# Patient Record
Sex: FEMALE | Race: BLACK OR AFRICAN AMERICAN | Employment: OTHER | URBAN - METROPOLITAN AREA
[De-identification: names, ages, dates, MRNs, and addresses within clinical notes are randomized per-mention and may not be internally consistent; named-entity substitution may affect disease eponyms.]

---

## 2017-04-17 ENCOUNTER — HOSPITAL ENCOUNTER (OUTPATIENT)
Dept: LAB | Age: 47
Discharge: HOME | End: 2017-04-17
Payer: COMMERCIAL

## 2017-04-17 ENCOUNTER — OFFICE VISIT (OUTPATIENT)
Dept: FAMILY MEDICINE CLINIC | Age: 47
End: 2017-04-17

## 2017-04-17 VITALS
TEMPERATURE: 98.7 F | HEART RATE: 71 BPM | HEIGHT: 65 IN | OXYGEN SATURATION: 98 % | SYSTOLIC BLOOD PRESSURE: 148 MMHG | BODY MASS INDEX: 44.12 KG/M2 | DIASTOLIC BLOOD PRESSURE: 98 MMHG | WEIGHT: 264.8 LBS | RESPIRATION RATE: 12 BRPM

## 2017-04-17 DIAGNOSIS — E55.9 VITAMIN D DEFICIENCY: ICD-10-CM

## 2017-04-17 DIAGNOSIS — E78.5 HYPERLIPIDEMIA, UNSPECIFIED HYPERLIPIDEMIA TYPE: ICD-10-CM

## 2017-04-17 DIAGNOSIS — I10 ESSENTIAL HYPERTENSION: ICD-10-CM

## 2017-04-17 DIAGNOSIS — B35.3 TINEA PEDIS OF BOTH FEET: ICD-10-CM

## 2017-04-17 DIAGNOSIS — E11.9 CONTROLLED TYPE 2 DIABETES MELLITUS WITHOUT COMPLICATION, WITHOUT LONG-TERM CURRENT USE OF INSULIN (HCC): ICD-10-CM

## 2017-04-17 DIAGNOSIS — E11.9 CONTROLLED TYPE 2 DIABETES MELLITUS WITHOUT COMPLICATION, WITHOUT LONG-TERM CURRENT USE OF INSULIN (HCC): Primary | ICD-10-CM

## 2017-04-17 LAB
ANION GAP BLD CALC-SCNC: 9 MMOL/L (ref 3–18)
BUN SERPL-MCNC: 10 MG/DL (ref 7–18)
BUN/CREAT SERPL: 14 (ref 12–20)
CALCIUM SERPL-MCNC: 9.1 MG/DL (ref 8.5–10.1)
CHLORIDE SERPL-SCNC: 104 MMOL/L (ref 100–108)
CHOLEST SERPL-MCNC: 203 MG/DL
CO2 SERPL-SCNC: 28 MMOL/L (ref 21–32)
CREAT SERPL-MCNC: 0.74 MG/DL (ref 0.6–1.3)
EST. AVERAGE GLUCOSE BLD GHB EST-MCNC: 140 MG/DL
GLUCOSE SERPL-MCNC: 71 MG/DL (ref 74–99)
HBA1C MFR BLD: 6.5 % (ref 4.2–5.6)
HDLC SERPL-MCNC: 46 MG/DL (ref 40–60)
HDLC SERPL: 4.4 {RATIO} (ref 0–5)
LDLC SERPL CALC-MCNC: 141.4 MG/DL (ref 0–100)
LIPID PROFILE,FLP: ABNORMAL
POTASSIUM SERPL-SCNC: 4.8 MMOL/L (ref 3.5–5.5)
SODIUM SERPL-SCNC: 141 MMOL/L (ref 136–145)
TRIGL SERPL-MCNC: 78 MG/DL (ref ?–150)
VLDLC SERPL CALC-MCNC: 15.6 MG/DL

## 2017-04-17 PROCEDURE — 82043 UR ALBUMIN QUANTITATIVE: CPT | Performed by: FAMILY MEDICINE

## 2017-04-17 PROCEDURE — 83036 HEMOGLOBIN GLYCOSYLATED A1C: CPT | Performed by: FAMILY MEDICINE

## 2017-04-17 PROCEDURE — 80061 LIPID PANEL: CPT | Performed by: FAMILY MEDICINE

## 2017-04-17 PROCEDURE — 80048 BASIC METABOLIC PNL TOTAL CA: CPT | Performed by: FAMILY MEDICINE

## 2017-04-17 PROCEDURE — 82306 VITAMIN D 25 HYDROXY: CPT | Performed by: FAMILY MEDICINE

## 2017-04-17 RX ORDER — LEVONORGESTREL 52 MG/1
INTRAUTERINE DEVICE INTRAUTERINE
COMMUNITY
Start: 2017-03-29

## 2017-04-17 RX ORDER — ENALAPRIL MALEATE AND HYDROCHLOROTHIAZIDE 10; 25 MG/1; MG/1
TABLET ORAL
Qty: 60 TAB | Refills: 1 | Status: SHIPPED | OUTPATIENT
Start: 2017-04-17 | End: 2017-10-19 | Stop reason: SDUPTHER

## 2017-04-17 RX ORDER — ERGOCALCIFEROL 1.25 MG/1
50000 CAPSULE ORAL 2 TIMES WEEKLY
Qty: 30 CAP | Refills: 4 | Status: SHIPPED | OUTPATIENT
Start: 2017-04-17 | End: 2017-10-19 | Stop reason: SDUPTHER

## 2017-04-17 NOTE — PROGRESS NOTES
Patient here for f/u on HTN. Will need refills today. 1. Have you been to the ER, urgent care clinic since your last visit? Hospitalized since your last visit? No    2. Have you seen or consulted any other health care providers outside of the [address] [place] [address] since your last visit? Include any pap smears or colon screening. Yes When: march 2017 Where: [facility] Reason for visit: Annual  Seen by Podiatry in Feb does not remember doctors name.

## 2017-04-17 NOTE — PATIENT INSTRUCTIONS
Please schedule your eye exam.      Athlete's Foot: Care Instructions  Your Care Instructions  Athlete's foot is an itchy rash on the foot caused by an infection with a fungus. You can get it by going barefoot in wet public areas, such as swimming pools or locker rooms. Many times there is no clear reason why you get athlete's foot. You can easily treat athlete's foot by putting medicine on your feet for 1 to 6 weeks. In some cases, a doctor may prescribe pills to kill the fungus. Follow-up care is a key part of your treatment and safety. Be sure to make and go to all appointments, and call your doctor if you are having problems. It's also a good idea to know your test results and keep a list of the medicines you take. How can you care for yourself at home? · Your doctor may suggest an over-the counter lotion or spray or may prescribe a medicine. Take your medicines exactly as prescribed. Call your doctor if you think you are having a problem with your medicine. · Keep your feet clean and dry. · When you get dressed, put your socks on before your underwear. This can prevent the fungus from spreading from your feet to your groin. To prevent athlete's foot  · Wear flip-flops or other shower sandals in public locker rooms and showers and by the pool. · Dry between your toes after swimming or bathing. · Wear leather shoes or sandals, which let air get to your feet. · Change your socks as needed so your feet stay as dry as possible. · Use antifungal powder on your feet. When should you call for help? Watch closely for changes in your health, and be sure to contact your doctor if:  · You do not get better as expected. Where can you learn more? Go to http://alyssa-amanda.info/. Enter M498 in the search box to learn more about \"Athlete's Foot: Care Instructions. \"  Current as of: October 13, 2016  Content Version: 11.2  © 3976-2091 [employer].  Care instructions adapted under license by [address] (which disclaims liability or warranty for this information). If you have questions about a medical condition or this instruction, always ask your healthcare professional. [address] any warranty or liability for your use of this information.

## 2017-04-17 NOTE — MR AVS SNAPSHOT
Visit Information Date & Time Provider Department Dept. Phone Encounter #  
 4/17/2017 10:00 AM Amandeep Montgomery MD [facility] [phone] [id number] Follow-up Instructions Return in about 2 weeks (around 5/1/2017) for blood pressure follow up and HBV vaccine, no labs prior. Upcoming Health Maintenance Date Due  
 FOOT EXAM Q1 3/29/2017 MICROALBUMIN Q1 3/29/2017 EYE EXAM RETINAL OR DILATED Q1 4/26/2017 HEMOGLOBIN A1C Q6M 5/4/2017 PAP AKA CERVICAL CYTOLOGY 8/21/2017 LIPID PANEL Q1 11/4/2017 DTaP/Tdap/Td series (2 - Td) 9/3/2025 Allergies as of 4/17/2017  Review Complete On: 4/17/2017 By: Amandeep Montgomery MD  
 No Known Allergies Current Immunizations  Reviewed on 3/29/2016 Name Date Hep B Vaccine (Adult) 11/11/2016, 3/29/2016 Influenza Nasal Vaccine 10/14/2013 Influenza Vaccine (Quad) PF 11/11/2016, 9/3/2015 Influenza Vaccine Split 10/13/2011 Pneumococcal Polysaccharide (PPSV-23) 3/29/2016 Tdap 9/3/2015 Not reviewed this visit You Were Diagnosed With   
  
 Codes Comments Controlled type 2 diabetes mellitus without complication, without long-term current use of insulin ([facility]    -  Primary ICD-10-CM: E11.9 ICD-9-CM: 250.00 Essential hypertension     ICD-10-CM: I10 
ICD-9-CM: 401.9 Tinea pedis of both feet     ICD-10-CM: B35.3 ICD-9-CM: 110.4 Vitamin D deficiency     ICD-10-CM: E55.9 ICD-9-CM: 268.9 Vitals BP Pulse Temp Resp Height(growth percentile) Weight(growth percentile) (!) 148/98 (BP 1 Location: Right arm, BP Patient Position: Sitting) 71 98.7 °F (37.1 °C) (Oral) 12 5' 5\" (1.651 m) 264 lb 12.8 oz (120.1 kg) SpO2 BMI OB Status Smoking Status 98% 44.07 kg/m2 IUD Never Smoker Vitals History BMI and BSA Data Body Mass Index Body Surface Area 44.07 kg/m 2 2.35 m 2 Preferred Pharmacy Pharmacy Name Phone [address] [phone] Your Updated Medication List  
  
   
This list is accurate as of: 4/17/17 10:42 AM.  Always use your most recent med list.  
  
  
  
  
 atorvastatin 20 mg tablet Commonly known as:  LIPITOR Take 1 Tab by mouth daily. enalapril-hydroCHLOROthiazide 10-25 mg tablet Commonly known as:  VASERETIC  
TAKE 2 TABLETS BY MOUTH DAILY  
  
 ergocalciferol 50,000 unit capsule Commonly known as:  VITAMIN D2 Take 1 Cap by mouth two (2) times a week. metFORMIN  mg tablet Commonly known as:  GLUCOPHAGE XR Take 2 Tabs by mouth daily (with dinner). MIRENA 20 mcg/24 hr (5 years) IUD Generic drug:  levonorgestrel RESTASIS 0.05 % ophthalmic emulsion Generic drug:  cycloSPORINE  
  
 VITAMIN B-12 PO Take  by mouth. Prescriptions Sent to Pharmacy Refills  
 enalapril-hydroCHLOROthiazide (VASERETIC) 10-25 mg tablet 1 Sig: TAKE 2 TABLETS BY MOUTH DAILY Class: Normal  
 Pharmacy: [facility] [employer] Ph #: [phone]  
 ergocalciferol (VITAMIN D2) 50,000 unit capsule 4 Sig: Take 1 Cap by mouth two (2) times a week. Class: Normal  
 Pharmacy: [address] Ph #: [phone] Route: Oral  
  
We Performed the Following  DIABETES FOOT EXAM [[facility]] Follow-up Instructions Return in about 2 weeks (around 5/1/2017) for blood pressure follow up and HBV vaccine, no labs prior. To-Do List   
 04/17/2017 Lab:  HEMOGLOBIN A1C WITH EAG   
  
 04/17/2017 Lab:  METABOLIC PANEL, BASIC   
  
 04/17/2017 Lab:  MICROALBUMIN, UR, RAND W/ MICROALBUMIN/CREA RATIO   
  
 04/17/2017 Lab:  VITAMIN D, 25 HYDROXY Patient Instructions Please schedule your eye exam. 
 
 
Athlete's Foot: Care Instructions Your Care Instructions Athlete's foot is an itchy rash on the foot caused by an infection with a fungus. You can get it by going barefoot in wet public areas, such as swimming pools or locker rooms. Many times there is no clear reason why you get athlete's foot. You can easily treat athlete's foot by putting medicine on your feet for 1 to 6 weeks. In some cases, a doctor may prescribe pills to kill the fungus. Follow-up care is a key part of your treatment and safety. Be sure to make and go to all appointments, and call your doctor if you are having problems. It's also a good idea to know your test results and keep a list of the medicines you take. How can you care for yourself at home? · Your doctor may suggest an over-the counter lotion or spray or may prescribe a medicine. Take your medicines exactly as prescribed. Call your doctor if you think you are having a problem with your medicine. · Keep your feet clean and dry. · When you get dressed, put your socks on before your underwear. This can prevent the fungus from spreading from your feet to your groin. To prevent athlete's foot · Wear flip-flops or other shower sandals in public locker rooms and showers and by the pool. · Dry between your toes after swimming or bathing. · Wear leather shoes or sandals, which let air get to your feet. · Change your socks as needed so your feet stay as dry as possible. · Use antifungal powder on your feet. When should you call for help? Watch closely for changes in your health, and be sure to contact your doctor if: 
· You do not get better as expected. Where can you learn more? Go to http://alyssa-amanda.info/. Enter M498 in the search box to learn more about \"Athlete's Foot: Care Instructions. \" Current as of: October 13, 2016 Content Version: 11.2 © 5892-1932 [employer].  Care instructions adapted under license by [employer] (which disclaims liability or warranty for this information). If you have questions about a medical condition or this instruction, always ask your healthcare professional. [address] any warranty or liability for your use of this information. Introducing \[facility]\"" & [facility] SERVICES! Dear April First: 
Thank you for requesting a [employer] account. Our records indicate that you have previously registered for a [employer] account but its currently inactive. Please call our [employer] support line at [phone]. Additional Information If you have questions, please visit the Frequently Asked Questions section of the [employer] website at [email or web address] Remember, [employer] is NOT to be used for urgent needs. For medical emergencies, dial 911. Now available from your iPhone and Android! Please provide this summary of care documentation to your next provider. Your primary care clinician is listed as Ever Stevenson. If you have any questions after today's visit, please call [phone].

## 2017-04-18 LAB
25(OH)D3 SERPL-MCNC: 26 NG/ML (ref 30–100)
CREAT UR-MCNC: 366.25 MG/DL (ref 30–125)
MICROALBUMIN UR-MCNC: 1.5 MG/DL (ref 0–3)
MICROALBUMIN/CREAT UR-RTO: 4 MG/G (ref 0–30)

## 2017-05-01 ENCOUNTER — HOSPITAL ENCOUNTER (OUTPATIENT)
Dept: LAB | Age: 47
Discharge: HOME | End: 2017-05-01
Payer: COMMERCIAL

## 2017-05-01 ENCOUNTER — OFFICE VISIT (OUTPATIENT)
Dept: FAMILY MEDICINE CLINIC | Age: 47
End: 2017-05-01

## 2017-05-01 VITALS
OXYGEN SATURATION: 97 % | DIASTOLIC BLOOD PRESSURE: 78 MMHG | SYSTOLIC BLOOD PRESSURE: 140 MMHG | HEART RATE: 83 BPM | HEIGHT: 65 IN | BODY MASS INDEX: 43.82 KG/M2 | TEMPERATURE: 98.2 F | WEIGHT: 263 LBS | RESPIRATION RATE: 12 BRPM

## 2017-05-01 DIAGNOSIS — E66.01 MORBID OBESITY, UNSPECIFIED OBESITY TYPE (HCC): ICD-10-CM

## 2017-05-01 DIAGNOSIS — E11.22 CONTROLLED TYPE 2 DIABETES MELLITUS WITH CHRONIC KIDNEY DISEASE, WITHOUT LONG-TERM CURRENT USE OF INSULIN, UNSPECIFIED CKD STAGE (HCC): ICD-10-CM

## 2017-05-01 DIAGNOSIS — E78.5 HYPERLIPIDEMIA, UNSPECIFIED HYPERLIPIDEMIA TYPE: ICD-10-CM

## 2017-05-01 DIAGNOSIS — I10 ESSENTIAL HYPERTENSION: Primary | ICD-10-CM

## 2017-05-01 LAB
ANION GAP BLD CALC-SCNC: 8 MMOL/L (ref 3–18)
BUN SERPL-MCNC: 11 MG/DL (ref 7–18)
BUN/CREAT SERPL: 13 (ref 12–20)
CALCIUM SERPL-MCNC: 9 MG/DL (ref 8.5–10.1)
CHLORIDE SERPL-SCNC: 103 MMOL/L (ref 100–108)
CO2 SERPL-SCNC: 31 MMOL/L (ref 21–32)
CREAT SERPL-MCNC: 0.83 MG/DL (ref 0.6–1.3)
GLUCOSE SERPL-MCNC: 123 MG/DL (ref 74–99)
POTASSIUM SERPL-SCNC: 3.7 MMOL/L (ref 3.5–5.5)
SODIUM SERPL-SCNC: 142 MMOL/L (ref 136–145)

## 2017-05-01 PROCEDURE — 80048 BASIC METABOLIC PNL TOTAL CA: CPT | Performed by: FAMILY MEDICINE

## 2017-05-01 NOTE — PATIENT INSTRUCTIONS
In Defense of Food: An Eater's Manifesto by [employer]. Mostly plants. Not too much. \"  The rest of this book, written by a  (i.e. no \"doctor-speak\"), explains what exactly each of those three statements mean. It then gives practical advice on how to do so and promote good health. I also recommend The Food Rules by the same author, for a quicker guide to those practical, actionable guidelines. 100daysofOravel  A blog generated by a family that followed his advice and experienced tangible benefits. Practical tips and resources for moving away from processed foods. How to Read a Food Label to Limit Sodium: Care Instructions  Your Care Instructions  Sodium causes your body to hold on to extra water. This can raise your blood pressure and force your heart and kidneys to work harder. In very serious cases, this could cause you to be put in the hospital. It might even be life-threatening. By limiting sodium, you will feel better and lower your risk of serious problems. Processed foods, fast food, and restaurant foods are the major sources of dietary sodium. The most common name for sodium is salt. Try to limit how much sodium you eat to less than 2,300 milligrams (mg) a day. If you limit your sodium to 1,500 mg a day, you can lower your blood pressure even more. This limit counts all the salt that you eat in foods you cook or in packaged foods. Keep a list of everything you eat and drink. Follow-up care is a key part of your treatment and safety. Be sure to make and go to all appointments, and call your doctor if you are having problems. It's also a good idea to know your test results and keep a list of the medicines you take. How can you care for yourself at home? Read ingredient lists on food labels  · Read the list of ingredients on food labels to help you find how much sodium is in a food.  The label lists the ingredients in a food in descending order (from the most to the least). If salt or sodium is high on the list, there may be a lot of sodium in the food. · Know that sodium has different names. Sodium is also called monosodium glutamate (MSG, common in [facility] food), sodium citrate, sodium alginate, sodium hydroxide, and sodium phosphate. Read Nutrition Facts labels  · On most foods, there is a Nutrition Facts label. This will tell you how much sodium is in one serving of food. Look at both the serving size and the sodium amount. The serving size is located at the top of the label, usually right under the \"Nutrition Facts\" title. The amount of sodium is given in the list under the title. It is given in milligrams (mg). ¨ Check the serving size carefully. A single serving is often very small, and you may eat more than one serving. If this is the case, you will eat more sodium than listed on the label. For example, if the serving size for a canned soup is 1 cup and the sodium amount is 470 mg, if you have 2 cups you will eat 940 mg of sodium. · The nutrition facts for fresh fruits and vegetables are not listed on the food. They may be listed somewhere in the store. These foods usually have no sodium or low sodium. · The Nutrition Facts label also gives you the Percent Daily Value for sodium. This is how much of the recommended amount of sodium a serving contains. The daily value for sodium is less than 2,300 mg. So if the Percent Daily Value says 50%, this means one serving is giving you half of this, or 1,150 mg. Buy low-sodium foods  · Look for foods that are made with less sodium. Watch for the following words on the label. ¨ \"Unsalted\" means there is no sodium added to the food. But there may be sodium already in the food naturally. ¨ \"Sodium-free\" means a serving has less than 5 mg of sodium. ¨ \"Very low sodium\" means a serving has 35 mg or less of sodium. ¨ \"Low-sodium\" means a serving has 140 mg or less of sodium.   · \"Reduced-sodium\" means that there is 25% less sodium than what the food normally has. This is still usually too much sodium. Try not to buy foods with this on the label. · Buy fresh vegetables, or frozen vegetables without added sauces. Buy low-sodium versions of canned vegetables, soups, and other canned goods. Where can you learn more? Go to http://alyssa-amanda.info/. Enter 26 [id number] in the search box to learn more about \"How to Read a Food Label to Limit Sodium: Care Instructions. \"  Current as of: July 26, 2016  Content Version: 11.2  © 2774-9342 [employer]. Care instructions adapted under license by [employer] (which disclaims liability or warranty for this information). If you have questions about a medical condition or this instruction, always ask your healthcare professional. [address] any warranty or liability for your use of this information. Low Sodium Diet (2,000 Milligram): Care Instructions  Your Care Instructions  Too much sodium causes your body to hold on to extra water. This can raise your blood pressure and force your heart and kidneys to work harder. In very serious cases, this could cause you to be put in the hospital. It might even be life-threatening. By limiting sodium, you will feel better and lower your risk of serious problems. The most common source of sodium is salt. People get most of the salt in their diet from canned, prepared, and packaged foods. Fast food and restaurant meals also are very high in sodium. Your doctor will probably limit your sodium to less than 2,000 milligrams (mg) a day. This limit counts all the sodium in prepared and packaged foods and any salt you add to your food. Follow-up care is a key part of your treatment and safety. Be sure to make and go to all appointments, and call your doctor if you are having problems. It's also a good idea to know your test results and keep a list of the medicines you take.   How can you care for yourself at home? Read food labels  · Read labels on cans and food packages. The labels tell you how much sodium is in each serving. Make sure that you look at the serving size. If you eat more than the serving size, you have eaten more sodium. · Food labels also tell you the Percent Daily Value for sodium. Choose products with low Percent Daily Values for sodium. · Be aware that sodium can come in forms other than salt, including monosodium glutamate (MSG), sodium citrate, and sodium bicarbonate (baking soda). MSG is often added to Asian food. When you eat out, you can sometimes ask for food without MSG or added salt. Buy low-sodium foods  · Buy foods that are labeled \"unsalted\" (no salt added), \"sodium-free\" (less than 5 mg of sodium per serving), or \"low-sodium\" (less than 140 mg of sodium per serving). Foods labeled \"reduced-sodium\" and \"light sodium\" may still have too much sodium. Be sure to read the label to see how much sodium you are getting. · Buy fresh vegetables, or frozen vegetables without added sauces. Buy low-sodium versions of canned vegetables, soups, and other canned goods. Prepare low-sodium meals  · Cut back on the amount of salt you use in cooking. This will help you adjust to the taste. Do not add salt after cooking. One teaspoon of salt has about 2,300 mg of sodium. · Take the salt shaker off the table. · Flavor your food with garlic, lemon juice, onion, vinegar, herbs, and spices. Do not use soy sauce, lite soy sauce, steak sauce, onion salt, garlic salt, celery salt, mustard, or ketchup on your food. · Use low-sodium salad dressings, sauces, and ketchup. Or make your own salad dressings and sauces without adding salt. · Use less salt (or none) when recipes call for it. You can often use half the salt a recipe calls for without losing flavor. Other foods such as rice, pasta, and grains do not need added salt. · Rinse canned vegetables, and cook them in fresh water.  This removes some--but not all--of the salt. · Avoid water that is naturally high in sodium or that has been treated with water softeners, which add sodium. Call your local water company to find out the sodium content of your water supply. If you buy bottled water, read the label and choose a sodium-free brand. Avoid high-sodium foods  · Avoid eating:  ¨ Smoked, cured, salted, and canned meat, fish, and poultry. ¨ Ham, perla, hot dogs, and luncheon meats. ¨ Regular, hard, and processed cheese and regular peanut butter. ¨ Crackers with salted tops, and other salted snack foods such as pretzels, chips, and salted popcorn. ¨ Frozen prepared meals, unless labeled low-sodium. ¨ Canned and dried soups, broths, and bouillon, unless labeled sodium-free or low-sodium. ¨ Canned vegetables, unless labeled sodium-free or low-sodium. ¨ Western [employer] fries, pizza, tacos, and other fast foods. ¨ Pickles, olives, ketchup, and other condiments, especially soy sauce, unless labeled sodium-free or low-sodium. Where can you learn more? Go to http://alyssa-amanda.info/. Enter I283 in the search box to learn more about \"Low Sodium Diet (2,000 Milligram): Care Instructions. \"  Current as of: July 26, 2016  Content Version: 11.2  © 7193-3724 [employer]. Care instructions adapted under license by [employer] (which disclaims liability or warranty for this information). If you have questions about a medical condition or this instruction, always ask your healthcare professional. [place] [address] any warranty or liability for your use of this information.

## 2017-05-01 NOTE — PROGRESS NOTES
Patient here for f/u on her HTN. She will also be getting her second Hep B. She has had her Dm eye exam done will get a release. 1. Have you been to the ER, urgent care clinic since your last visit? Hospitalized since your last visit? No    2. Have you seen or consulted any other health care providers outside of the [address] [place] [address] since your last visit? Include any pap smears or colon screening.  Yes When: April 2017 Where: [facility] Reason for visit: IUD removal and insertion

## 2017-05-01 NOTE — MR AVS SNAPSHOT
Visit Information Date & Time Provider Department Dept. Phone Encounter #  
 5/1/2017 11:00 AM Luis Fernando Weems MD [facility] 14 [phone] [id number] Follow-up Instructions Return in about 6 weeks (around 6/12/2017) for blood pressure follow up. Upcoming Health Maintenance Date Due  
 EYE EXAM RETINAL OR DILATED Q1 4/26/2017 PAP AKA CERVICAL CYTOLOGY 8/21/2017 INFLUENZA AGE 9 TO ADULT 8/1/2017 HEMOGLOBIN A1C Q6M 10/17/2017 FOOT EXAM Q1 4/17/2018 MICROALBUMIN Q1 4/17/2018 LIPID PANEL Q1 4/17/2018 DTaP/Tdap/Td series (2 - Td) 9/3/2025 Allergies as of 5/1/2017  Review Complete On: 5/1/2017 By: Luis Fernando Weems MD  
 No Known Allergies Current Immunizations  Reviewed on 3/29/2016 Name Date Hep B Vaccine (Adult)  Incomplete, 11/11/2016, 3/29/2016 Influenza Nasal Vaccine 10/14/2013 Influenza Vaccine (Quad) PF 11/11/2016, 9/3/2015 Influenza Vaccine Split 10/13/2011 Pneumococcal Polysaccharide (PPSV-23) 3/29/2016 Tdap 9/3/2015 Not reviewed this visit You Were Diagnosed With   
  
 Codes Comments Essential hypertension    -  Primary ICD-10-CM: I10 
ICD-9-CM: 401.9 Controlled type 2 diabetes mellitus with chronic kidney disease, without long-term current use of insulin, unspecified CKD stage     ICD-10-CM: E11.22 
ICD-9-CM: 250.40, 585.9 Morbid obesity, unspecified obesity type     ICD-10-CM: E66.01 
ICD-9-CM: 278.01 Encounter for immunization     ICD-10-CM: I99 ICD-9-CM: V03.89 Hyperlipidemia, unspecified hyperlipidemia type     ICD-10-CM: E78.5 ICD-9-CM: 272.4 Vitals BP Pulse Temp Resp Height(growth percentile) Weight(growth percentile) 140/78 (BP 1 Location: Left arm, BP Patient Position: Sitting) 83 98.2 °F (36.8 °C) (Oral) 12 5' 5\" (1.651 m) 263 lb (119.3 kg) SpO2 BMI OB Status Smoking Status 97% 43.77 kg/m2 IUD Never Smoker Vitals History BMI and BSA Data Body Mass Index Body Surface Area 43.77 kg/m 2 2.34 m 2 Preferred Pharmacy Pharmacy Name Phone [facility], [address]  [phone] Your Updated Medication List  
  
   
This list is accurate as of: 5/1/17 11:51 AM.  Always use your most recent med list.  
  
  
  
  
 atorvastatin 20 mg tablet Commonly known as:  LIPITOR Take 1 Tab by mouth daily. enalapril-hydroCHLOROthiazide 10-25 mg tablet Commonly known as:  VASERETIC  
TAKE 2 TABLETS BY MOUTH DAILY  
  
 ergocalciferol 50,000 unit capsule Commonly known as:  VITAMIN D2 Take 1 Cap by mouth two (2) times a week. metFORMIN  mg tablet Commonly known as:  GLUCOPHAGE XR Take 2 Tabs by mouth daily (with dinner). MIRENA 20 mcg/24 hr (5 years) IUD Generic drug:  levonorgestrel RESTASIS 0.05 % ophthalmic emulsion Generic drug:  cycloSPORINE  
  
 VITAMIN B-12 PO Take  by mouth. We Performed the Following HEPATITIS B VACCINE, ADULT DOSAGE, IM [22618 CPT(R)] REFERRAL TO NUTRITION [REF50 Custom] Comments:  
 Tomás Cornejo is a 56 y.o. female with DM2 (A1c 6.5), hypertension, early signs nephropathy consuming WAY too much salt. Please evaluate and treat as indicated. Thank you. If this patient cannot be seen by the named provider within 1 month(s), schedule with FIRST AVAILABLE. Follow-up Instructions Return in about 6 weeks (around 6/12/2017) for blood pressure follow up. To-Do List   
 05/01/2017 Lab:  METABOLIC PANEL, BASIC Referral Information Referral ID Referred By Referred To  
  
 [id number] Joi GRAJEDA [address] [facility] [place], [address] Visits Status Start Date End Date 1 New Request 5/1/17 5/1/18  If your referral has a status of pending review or denied, additional information will be sent to support the outcome of this decision. Patient Instructions In Defense of Food: An Eater's Manifesto by Lj Fuentes \"Eat real food. Mostly plants. Not too much. \"  The rest of this book, written by a  (i.e. no \"doctor-speak\"), explains what exactly each of those three statements mean. It then gives practical advice on how to do so and promote good health. I also recommend The Food Rules by the same author, for a quicker guide to those practical, actionable guidelines. 100daysofStorelift A blog generated by a family that followed his advice and experienced tangible benefits. Practical tips and resources for moving away from processed foods. How to Read a Food Label to Limit Sodium: Care Instructions Your Care Instructions Sodium causes your body to hold on to extra water. This can raise your blood pressure and force your heart and kidneys to work harder. In very serious cases, this could cause you to be put in the hospital. It might even be life-threatening. By limiting sodium, you will feel better and lower your risk of serious problems. Processed foods, fast food, and restaurant foods are the major sources of dietary sodium. The most common name for sodium is salt. Try to limit how much sodium you eat to less than 2,300 milligrams (mg) a day. If you limit your sodium to 1,500 mg a day, you can lower your blood pressure even more. This limit counts all the salt that you eat in foods you cook or in packaged foods. Keep a list of everything you eat and drink. Follow-up care is a key part of your treatment and safety. Be sure to make and go to all appointments, and call your doctor if you are having problems. It's also a good idea to know your test results and keep a list of the medicines you take. How can you care for yourself at home? Read ingredient lists on food labels · Read the list of ingredients on food labels to help you find how much sodium is in a food. The label lists the ingredients in a food in descending order (from the most to the least). If salt or sodium is high on the list, there may be a lot of sodium in the food. · Know that sodium has different names. Sodium is also called monosodium glutamate (MSG, common in [facility] food), sodium citrate, sodium alginate, sodium hydroxide, and sodium phosphate. Read Nutrition Facts labels · On most foods, there is a Nutrition Facts label. This will tell you how much sodium is in one serving of food. Look at both the serving size and the sodium amount. The serving size is located at the top of the label, usually right under the \"Nutrition Facts\" title. The amount of sodium is given in the list under the title. It is given in milligrams (mg). ¨ Check the serving size carefully. A single serving is often very small, and you may eat more than one serving. If this is the case, you will eat more sodium than listed on the label. For example, if the serving size for a canned soup is 1 cup and the sodium amount is 470 mg, if you have 2 cups you will eat 940 mg of sodium. · The nutrition facts for fresh fruits and vegetables are not listed on the food. They may be listed somewhere in the store. These foods usually have no sodium or low sodium. · The Nutrition Facts label also gives you the Percent Daily Value for sodium. This is how much of the recommended amount of sodium a serving contains. The daily value for sodium is less than 2,300 mg. So if the Percent Daily Value says 50%, this means one serving is giving you half of this, or 1,150 mg. Buy low-sodium foods · Look for foods that are made with less sodium. Watch for the following words on the label. ¨ \"Unsalted\" means there is no sodium added to the food. But there may be sodium already in the food naturally. ¨ \"Sodium-free\" means a serving has less than 5 mg of sodium. ¨ \"Very low sodium\" means a serving has 35 mg or less of sodium. ¨ \"Low-sodium\" means a serving has 140 mg or less of sodium. · \"Reduced-sodium\" means that there is 25% less sodium than what the food normally has. This is still usually too much sodium. Try not to buy foods with this on the label. · Buy fresh vegetables, or frozen vegetables without added sauces. Buy low-sodium versions of canned vegetables, soups, and other canned goods. Where can you learn more? Go to http://alyssaMovarisamanda.info/. Enter 26 [id number] in the search box to learn more about \"How to Read a Food Label to Limit Sodium: Care Instructions. \" Current as of: July 26, 2016 Content Version: 11.2 © 2640-6441 [employer]. Care instructions adapted under license by [employer] (which disclaims liability or warranty for this information). If you have questions about a medical condition or this instruction, always ask your healthcare professional. [place] [address] any warranty or liability for your use of this information. Low Sodium Diet (2,000 Milligram): Care Instructions Your Care Instructions Too much sodium causes your body to hold on to extra water. This can raise your blood pressure and force your heart and kidneys to work harder. In very serious cases, this could cause you to be put in the hospital. It might even be life-threatening. By limiting sodium, you will feel better and lower your risk of serious problems. The most common source of sodium is salt. People get most of the salt in their diet from canned, prepared, and packaged foods. Fast food and restaurant meals also are very high in sodium. Your doctor will probably limit your sodium to less than 2,000 milligrams (mg) a day. This limit counts all the sodium in prepared and packaged foods and any salt you add to your food. Follow-up care is a key part of your treatment and safety.  Be sure to make and go to all appointments, and call your doctor if you are having problems. It's also a good idea to know your test results and keep a list of the medicines you take. How can you care for yourself at home? Read food labels · Read labels on cans and food packages. The labels tell you how much sodium is in each serving. Make sure that you look at the serving size. If you eat more than the serving size, you have eaten more sodium. · Food labels also tell you the Percent Daily Value for sodium. Choose products with low Percent Daily Values for sodium. · Be aware that sodium can come in forms other than salt, including monosodium glutamate (MSG), sodium citrate, and sodium bicarbonate (baking soda). MSG is often added to Asian food. When you eat out, you can sometimes ask for food without MSG or added salt. Buy low-sodium foods · Buy foods that are labeled \"unsalted\" (no salt added), \"sodium-free\" (less than 5 mg of sodium per serving), or \"low-sodium\" (less than 140 mg of sodium per serving). Foods labeled \"reduced-sodium\" and \"light sodium\" may still have too much sodium. Be sure to read the label to see how much sodium you are getting. · Buy fresh vegetables, or frozen vegetables without added sauces. Buy low-sodium versions of canned vegetables, soups, and other canned goods. Prepare low-sodium meals · Cut back on the amount of salt you use in cooking. This will help you adjust to the taste. Do not add salt after cooking. One teaspoon of salt has about 2,300 mg of sodium. · Take the salt shaker off the table. · Flavor your food with garlic, lemon juice, onion, vinegar, herbs, and spices. Do not use soy sauce, lite soy sauce, steak sauce, onion salt, garlic salt, celery salt, mustard, or ketchup on your food. · Use low-sodium salad dressings, sauces, and ketchup. Or make your own salad dressings and sauces without adding salt. · Use less salt (or none) when recipes call for it.  You can often use half the salt a recipe calls for without losing flavor. Other foods such as rice, pasta, and grains do not need added salt. · Rinse canned vegetables, and cook them in fresh water. This removes somebut not allof the salt. · Avoid water that is naturally high in sodium or that has been treated with water softeners, which add sodium. Call your local water company to find out the sodium content of your water supply. If you buy bottled water, read the label and choose a sodium-free brand. Avoid high-sodium foods · Avoid eating: ¨ Smoked, cured, salted, and canned meat, fish, and poultry. ¨ Ham, perla, hot dogs, and luncheon meats. ¨ Regular, hard, and processed cheese and regular peanut butter. ¨ Crackers with salted tops, and other salted snack foods such as pretzels, chips, and salted popcorn. ¨ Frozen prepared meals, unless labeled low-sodium. ¨ Canned and dried soups, broths, and bouillon, unless labeled sodium-free or low-sodium. ¨ Canned vegetables, unless labeled sodium-free or low-sodium. ¨ Western Chaparrita fries, pizza, tacos, and other fast foods. ¨ Pickles, olives, ketchup, and other condiments, especially soy sauce, unless labeled sodium-free or low-sodium. Where can you learn more? Go to http://alyssa-amanda.info/. Enter D964 in the search box to learn more about \"Low Sodium Diet (2,000 Milligram): Care Instructions. \" Current as of: July 26, 2016 Content Version: 11.2 © 1172-6778 [email or web address]. Care instructions adapted under license by [employer] (which disclaims liability or warranty for this information). If you have questions about a medical condition or this instruction, always ask your healthcare professional. [place] [address] any warranty or liability for your use of this information. Introducing [facility]! Dear Dennis Steven: 
Thank you for requesting a Omni-ID account.   Our records indicate that you already have an active Meno[employer]iX account. You can access your account anytime at [email or web address] Did you know that you can access your hospital and ER discharge instructions at any time in Meno[employer]iX? You can also review all of your test results from your hospital stay or ER visit. Additional Information If you have questions, please visit the Frequently Asked Questions section of the [employer] website at [email or web address] Remember, MenoGeniX is NOT to be used for urgent needs. For medical emergencies, dial 911. Now available from your iPhone and Android! Please provide this summary of care documentation to your next provider. Your primary care clinician is listed as Kimberlyn Arreola. If you have any questions after today's visit, please call [phone].

## 2017-05-01 NOTE — PROGRESS NOTES
Shan Halsted is a 56 y.o. female    Follow-up hypertension resumed enalapril hydrochlorothiazide as of date 4/17/2017. No side effects. Soc: diet rich in sodium: Deli meats, pepperoni pizza. .. Low in whole fruit    Follow-up DM 2 on metformin  Follow-up hyperlipidemia: Admits likely only taking atorvastatin 3 days per week prior to visit dated 4/17/2017    Patient Care Team:  Teena Arauz MD as PCP - General  Rohit Nettles, RN as Nurse Navigator  No Known Allergies  Outpatient Prescriptions Marked as Taking for the 5/1/17 encounter (Office Visit) with Teena Arauz MD   Medication Sig Dispense Refill    enalapril-hydroCHLOROthiazide (VASERETIC) 10-25 mg tablet TAKE 2 TABLETS BY MOUTH DAILY 60 Tab 1    MIRENA 20 mcg/24 hr (5 years) IUD       ergocalciferol (VITAMIN D2) 50,000 unit capsule Take 1 Cap by mouth two (2) times a week. 30 Cap 4    metFORMIN ER (GLUCOPHAGE XR) 500 mg tablet Take 2 Tabs by mouth daily (with dinner). 180 Tab 4    atorvastatin (LIPITOR) 20 mg tablet Take 1 Tab by mouth daily. 90 Tab 4    RESTASIS 0.05 % ophthalmic emulsion   0    CYANOCOBALAMIN, VITAMIN B-12, (VITAMIN B-12 PO) Take  by mouth. Patient Active Problem List    Diagnosis    Advance directive discussed with patient    Diabetes mellitus type 2, controlled ([facility] [address]     3/29/16: dx'ed. Increased metformin (started for prediabetes) to 2000 mg daily.  Hyperlipidemia     9/4/2015: ASCVD 10 year risk 2.2 %., lifetime 39.1% 3/29/16: dx DM2. Statin initiated.         Vitamin D deficiency    Essential hypertension    Morbid obesity ([facility] [address]     Past Medical History:   Diagnosis Date    Asthma     Blood transfusion 1979    Cervical dysplasia 1997    colposcopy surveillance, normal since    Depression     Hypertension     IUD (intrauterine device) in place 8/2/2012    Mirena    Morbid obesity [facility])      Past Surgical History:   Procedure Laterality Date    HX APPENDECTOMY  1979    HX BACK SURGERY  2013 right L4-5 hemilaminectomy, cauda equina syndrome    HX GI  1979    partial small intestine removed    HX GYN  1978    left fallopian tube removed    HX GYN  1993        HX GYN  2007        HX ORTHOPAEDIC  2004    fractured ankle     Family History   Problem Relation Age of Onset    Diabetes Mother     Hypertension Mother     Stroke Mother     Coronary Artery Disease Mother 54    Alcohol abuse Father     Hypertension Father     Diabetes Father     Heart Failure Father      CHF    Colon Cancer Paternal Uncle     Colon Cancer Maternal Grandfather     Ovarian Cancer Maternal Aunt      Social History     Social History    Marital status:      Spouse name: N/A    Number of children: N/A    Years of education: N/A     Occupational History    Not on file. Social History Main Topics    Smoking status: Never Smoker    Smokeless tobacco: Never Used    Alcohol use Yes      Comment: rarely    Drug use: No    Sexual activity: Yes     Partners: Male     Birth control/ protection: IUD     Other Topics Concern    Not on file     Social History Narrative         Review of Systems   Respiratory: Negative for cough. Genitourinary: Negative for frequency. Visit Vitals    /78 (BP 1 Location: Left arm, BP Patient Position: Sitting)    Pulse 83    Temp 98.2 °F (36.8 °C) (Oral)    Resp 12    Ht 5' 5\" (1.651 m)    Wt 263 lb (119.3 kg)    SpO2 97%    BMI 43.77 kg/m2     Physical Exam   Constitutional: She is oriented to person, place, and time. She appears well-developed. No distress. Pleasant morbidly obese black female   HENT:   Head: Normocephalic and atraumatic. Pulmonary/Chest: Effort normal.   Neurological: She is alert and oriented to person, place, and time. Psychiatric: She has a normal mood and affect.  Her behavior is normal. Judgment and thought content normal.     Results for orders placed or performed during the hospital encounter of 17 HEMOGLOBIN A1C WITH EAG   Result Value Ref Range    Hemoglobin A1c 6.5 (H) 4.2 - 5.6 %    Est. average glucose 681 mg/dL   METABOLIC PANEL, BASIC   Result Value Ref Range    Sodium 141 136 - 145 mmol/L    Potassium 4.8 3.5 - 5.5 mmol/L    Chloride 104 100 - 108 mmol/L    CO2 28 21 - 32 mmol/L    Anion gap 9 3.0 - 18 mmol/L    Glucose 71 (L) 74 - 99 mg/dL    BUN 10 7.0 - 18 MG/DL    Creatinine 0.74 0.6 - 1.3 MG/DL    BUN/Creatinine ratio 14 12 - 20      GFR est AA >60 >60 ml/min/1.73m2    GFR est non-AA >60 >60 ml/min/1.73m2    Calcium 9.1 8.5 - 10.1 MG/DL   MICROALBUMIN, UR, RAND W/ MICROALBUMIN/CREA RATIO   Result Value Ref Range    Microalbumin,urine random 1.50 0 - 3.0 MG/DL    Creatinine, urine 366.25 (H) 30 - 125 mg/dL    Microalbumin/Creat ratio (mg/g creat) 4 0 - 30 mg/g   VITAMIN D, 25 HYDROXY   Result Value Ref Range    Vitamin D 25-Hydroxy 26.0 (L) 30 - 100 ng/mL   LIPID PANEL W/ REFLX DIRECT LDL   Result Value Ref Range    LIPID PROFILE          Cholesterol, total 203 (H) <200 MG/DL    Triglyceride 78 <150 MG/DL    HDL Cholesterol 46 40 - 60 MG/DL    LDL, calculated 141.4 (H) 0 - 100 MG/DL    VLDL, calculated 15.6 MG/DL    CHOL/HDL Ratio 4.4 0 - 5.0         ICD-10-CM ICD-9-CM    1. Essential hypertension I10 401.9 REFERRAL TO NUTRITION      METABOLIC PANEL, BASIC   2. Controlled type 2 diabetes mellitus with chronic kidney disease, without long-term current use of insulin, unspecified CKD stage E11.22 250.40 REFERRAL TO NUTRITION     585.9 HEPATITIS B VACCINE, ADULT DOSAGE, IM   3. Morbid obesity, unspecified obesity type E66.01 278.01 REFERRAL TO NUTRITION   4. Encounter for immunization Z23 V03.89 HEPATITIS B VACCINE, ADULT DOSAGE, IM   5. Hyperlipidemia, unspecified hyperlipidemia type E78.5 272.4        Hypertension: Improving but not well controlled. Desires trial of dietary management prior to adjusting medications. DM 2: A1c within target.       Creatinine and urine highlights need to maintain diabetes control and achieve hypertensive control    Hyperlipidemia: Uncontrolled due to inconsistent statin use. She is now committed to daily therapy. The patient understands our medical plan. Alternatives have been explained and offered. The risks, benefits and significant side effects of all medications have been reviewed. All questions have been answered. She is encouraged to employ the information provided in the after visit summary, which was reviewed. She is instructed to call the clinic if she has not been notified either by phone or through [address] with the results of her tests or with an appointment plan for any referrals within 1 week(s). No news is not good news; it's no news. The patient  is to call if her condition worsens or fails to improve or if significant side effects are experienced. Follow-up Disposition:  Return in about 6 weeks (around 6/12/2017) for blood pressure follow up. Dragon medical dictation software was used for portions of this report. Unintended voice recognition errors may occur.

## 2017-07-11 ENCOUNTER — TELEPHONE (OUTPATIENT)
Dept: FAMILY MEDICINE CLINIC | Age: 47
End: 2017-07-11

## 2017-07-11 NOTE — TELEPHONE ENCOUNTER
The plan at the time of our last visit in May was to follow-up for uncontrolled hypertension in June. Please schedule, no labs prior.   Thank you, HAL

## 2017-07-11 NOTE — LETTER
7/27/2017 3:07 PM 
 
Ms. Kyara Grady 35 Dear Ms. Julieta Monk, We have been unable to reach you by phone. Dr. Hamilton Baca is requesting you call our office to schedule a follow-up appointment. Please call our office at [phone] and we will be happy to schedule you. . 
 
 
 
Sincerely, [facility] staff

## 2017-07-12 ENCOUNTER — HOSPITAL ENCOUNTER (OUTPATIENT)
Dept: NUTRITION | Age: 47
End: 2017-07-12

## 2017-07-27 NOTE — TELEPHONE ENCOUNTER
Her  called a few days ago in regard to himself and I let him know I was trying to reach Mrs. Huerta.  He said he would make her aware but no response yet so letter will be sent per Dr. Cassie Santos request.

## 2017-09-14 ENCOUNTER — OFFICE VISIT (OUTPATIENT)
Dept: FAMILY MEDICINE CLINIC | Age: 47
End: 2017-09-14

## 2017-09-14 VITALS
BODY MASS INDEX: 42.1 KG/M2 | WEIGHT: 253 LBS | SYSTOLIC BLOOD PRESSURE: 136 MMHG | HEART RATE: 74 BPM | DIASTOLIC BLOOD PRESSURE: 84 MMHG | OXYGEN SATURATION: 98 % | TEMPERATURE: 98.3 F | RESPIRATION RATE: 16 BRPM

## 2017-09-14 DIAGNOSIS — E55.9 VITAMIN D DEFICIENCY: ICD-10-CM

## 2017-09-14 DIAGNOSIS — E11.22 CONTROLLED TYPE 2 DIABETES MELLITUS WITH CHRONIC KIDNEY DISEASE, WITHOUT LONG-TERM CURRENT USE OF INSULIN, UNSPECIFIED CKD STAGE (HCC): ICD-10-CM

## 2017-09-14 DIAGNOSIS — I10 ESSENTIAL HYPERTENSION: Primary | ICD-10-CM

## 2017-09-14 DIAGNOSIS — Z23 ENCOUNTER FOR IMMUNIZATION: ICD-10-CM

## 2017-09-14 DIAGNOSIS — E66.01 MORBID OBESITY, UNSPECIFIED OBESITY TYPE (HCC): ICD-10-CM

## 2017-09-14 DIAGNOSIS — E78.5 HYPERLIPIDEMIA, UNSPECIFIED HYPERLIPIDEMIA TYPE: ICD-10-CM

## 2017-09-14 RX ORDER — ALBUTEROL SULFATE 90 UG/1
2 AEROSOL, METERED RESPIRATORY (INHALATION)
COMMUNITY
End: 2020-05-08

## 2017-09-14 RX ORDER — ENALAPRIL MALEATE AND HYDROCHLOROTHIAZIDE 10; 25 MG/1; MG/1
TABLET ORAL
COMMUNITY
End: 2017-09-14 | Stop reason: SDUPTHER

## 2017-09-14 RX ORDER — AMLODIPINE BESYLATE 5 MG/1
5 TABLET ORAL DAILY
Qty: 30 TAB | Refills: 1 | Status: SHIPPED | OUTPATIENT
Start: 2017-09-14 | End: 2017-10-19 | Stop reason: SDUPTHER

## 2017-09-14 NOTE — PATIENT INSTRUCTIONS
In Defense of Food: An Eater's Manifesto by [employer]. Mostly plants. Not too much. \"  The rest of this book, written by a  (i.e. no \"doctor-speak\"), explains what exactly each of those three statements mean. It then gives practical advice on how to do so and promote good health. I also recommend The Food Rules by the same author, for a quicker guide to those practical, actionable guidelines. 100daysofAmarin  A blog generated by a family that followed his advice and experienced tangible benefits. Practical tips and resources for moving away from processed foods.

## 2017-09-14 NOTE — ASSESSMENT & PLAN NOTE
Improving, based on history, physical exam and review of pertinent labs, studies and medications; meds reconciled; push further with dietary changes. Facilitating nutrition evaluation. , .  Key Obesity Meds     Patient is on no anti-obesity meds.         Lab Results   Component Value Date/Time    Hemoglobin A1c 6.5 04/17/2017 10:59 AM    Glucose 123 05/01/2017 12:21 PM    Cholesterol, total 203 04/17/2017 10:59 AM    HDL Cholesterol 46 04/17/2017 10:59 AM    LDL, calculated 141.4 04/17/2017 10:59 AM    Triglyceride 78 04/17/2017 10:59 AM    Sodium 142 05/01/2017 12:21 PM    Potassium 3.7 05/01/2017 12:21 PM    ALT (SGPT) 19 11/04/2016 09:56 AM    AST (SGOT) 7 11/04/2016 09:56 AM

## 2017-09-14 NOTE — ASSESSMENT & PLAN NOTE
Uncontrolled, based on history, physical exam and review of pertinent labs, studies and medications; meds reconciled; changes to treatment plan as per orders. Key CAD CHF Meds             amLODIPine (NORVASC) 5 mg tablet  (Taking) Take 1 Tab by mouth daily.     enalapril-hydroCHLOROthiazide (VASERETIC) 10-25 mg tablet  (Taking) TAKE 2 TABLETS BY MOUTH DAILY

## 2017-09-14 NOTE — PROGRESS NOTES
Kimmy Terry is a 56 y.o. female    Follow-up hypertension in the setting of DM 2 and morbid obesity most recent OV 5/1/2017 when had recently resumed enalapril hydrochlorothiazide. Pressure had not been well controlled. Elected trial of dietary management prior to adjust medications    Soc: more \"mindful\" eating, \"green cleanses\" (fruit/veggie smoothies) as replacement for single meal daily, eliminated processed sugars     feels great    DM2: stopped metformin late May due to unrelenting n/v which resolved with cessation    Scheduling mishaps x 2 with nutrition    Follow-up hyperlipidemia previously uncontrolled due to inconsistent statin use. Better compliance    Patient Care Team:  Ivory Finn MD as PCP - General  Kirby Brandt RN as Nurse Navigator  No Known Allergies  Outpatient Prescriptions Marked as Taking for the 9/14/17 encounter (Office Visit) with Ivory Finn MD   Medication Sig Dispense Refill    albuterol (PROVENTIL HFA, VENTOLIN HFA, PROAIR HFA) 90 mcg/actuation inhaler 2 Puffs.  enalapril-hydroCHLOROthiazide (VASERETIC) 10-25 mg tablet TAKE 2 TABLETS BY MOUTH DAILY 60 Tab 1    MIRENA 20 mcg/24 hr (5 years) IUD       ergocalciferol (VITAMIN D2) 50,000 unit capsule Take 1 Cap by mouth two (2) times a week. 30 Cap 4    atorvastatin (LIPITOR) 20 mg tablet Take 1 Tab by mouth daily. 90 Tab 4    RESTASIS 0.05 % ophthalmic emulsion   0    CYANOCOBALAMIN, VITAMIN B-12, (VITAMIN B-12 PO) Take  by mouth. Patient Active Problem List    Diagnosis    Advance directive discussed with patient    Controlled type 2 diabetes mellitus with diabetic nephropathy, without long-term current use of insulin ([facility]     3/29/16: dx'ed. Increased metformin (started for prediabetes) to 2000 mg daily.  Hyperlipidemia     9/4/2015: ASCVD 10 year risk 2.2 %., lifetime 39.1% 3/29/16: dx DM2. Statin initiated.         Vitamin D deficiency    Essential hypertension    Morbid obesity ([facility] Past Medical History:   Diagnosis Date    Asthma     Blood transfusion     Cervical dysplasia     colposcopy surveillance, normal since    Depression     Hypertension     IUD (intrauterine device) in place 2012    Mirena    Morbid obesity ([address]      Past Surgical History:   Procedure Laterality Date    HX APPENDECTOMY      HX BACK SURGERY      right L4-5 hemilaminectomy, cauda equina syndrome    HX GI      partial small intestine removed    HX GYN      left fallopian tube removed    HX GYN          HX GYN          HX ORTHOPAEDIC  2004    fractured ankle     Family History   Problem Relation Age of Onset    Diabetes Mother     Hypertension Mother     Stroke Mother     Coronary Artery Disease Mother 54    Alcohol abuse Father     Hypertension Father     Diabetes Father     Heart Failure Father      CHF    Colon Cancer Paternal Uncle     Colon Cancer Maternal Grandfather     Ovarian Cancer Maternal Aunt      Social History     Social History    Marital status:      Spouse name: N/A    Number of children: N/A    Years of education: N/A     Occupational History    Not on file. Social History Main Topics    Smoking status: Never Smoker    Smokeless tobacco: Never Used    Alcohol use Yes      Comment: rarely    Drug use: No    Sexual activity: Yes     Partners: Male     Birth control/ protection: IUD     Other Topics Concern    Not on file     Social History Narrative         Review of Systems   Constitutional: Negative for malaise/fatigue. Respiratory: Negative for shortness of breath. Cardiovascular: Negative for chest pain. Musculoskeletal: Negative for myalgias.      Visit Vitals    /84 (BP 1 Location: Left arm, BP Patient Position: Sitting)    Pulse 74    Temp 98.3 °F (36.8 °C)    Resp 16    Wt 253 lb (114.8 kg)    SpO2 98%    BMI 42.1 kg/m2     Physical Exam   Constitutional: She is oriented to person, place, and time. She appears well-developed. No distress. Pleasant morbidly obese black female   HENT:   Head: Normocephalic and atraumatic. Pulmonary/Chest: Effort normal.   Neurological: She is alert and oriented to person, place, and time. Psychiatric: She has a normal mood and affect. Her behavior is normal. Judgment and thought content normal.     Results for orders placed or performed during the hospital encounter of 76/45/87   METABOLIC PANEL, BASIC   Result Value Ref Range    Sodium 142 136 - 145 mmol/L    Potassium 3.7 3.5 - 5.5 mmol/L    Chloride 103 100 - 108 mmol/L    CO2 31 21 - 32 mmol/L    Anion gap 8 3.0 - 18 mmol/L    Glucose 123 (H) 74 - 99 mg/dL    BUN 11 7.0 - 18 MG/DL    Creatinine 0.83 0.6 - 1.3 MG/DL    BUN/Creatinine ratio 13 12 - 20      GFR est AA >60 >60 ml/min/1.73m2    GFR est non-AA >60 >60 ml/min/1.73m2    Calcium 9.0 8.5 - 10.1 MG/DL     Component      Latest Ref Rng & Units 4/17/2017 3/29/2016 8/2/2013          10:59 AM 11:45 AM 10:59 AM   Creatinine, urine      30 - 125 mg/dL 366.25 (H) 358.89 (H) 259.2   Microalbumin, urine      0.0 - 17.0 ug/mL   4.9   Microalbumin/Creat. Ratio      0 - 30 mg/g 4 4 1.9   Microalbumin,urine random      0 - 3.0 MG/DL 1.50 1.30      Diagnoses and all orders for this visit:    1. Essential hypertension  Assessment & Plan:  Uncontrolled, based on history, physical exam and review of pertinent labs, studies and medications; meds reconciled; changes to treatment plan as per orders. Key CAD CHF Meds             amLODIPine (NORVASC) 5 mg tablet  (Taking) Take 1 Tab by mouth daily. enalapril-hydroCHLOROthiazide (VASERETIC) 10-25 mg tablet  (Taking) TAKE 2 TABLETS BY MOUTH DAILY            Orders:  -     amLODIPine (NORVASC) 5 mg tablet; Take 1 Tab by mouth daily.  -     METABOLIC PANEL, BASIC; Future    2.  Controlled type 2 diabetes mellitus with chronic kidney disease, without long-term current use of insulin, unspecified CKD stage  Assessment & Plan:  No longer on metformin due to side effects, but with significant dietary changes as evidenced by 10 pound weight loss in the interim. Continue without medication at present. Facilitating nutrition appointment. Schedule eye exam  Key Antihyperglycemic Medications     Patient is on no antihyperglycemic meds. Other Key Diabetic Medications             enalapril-hydroCHLOROthiazide (VASERETIC) 10-25 mg tablet  (Taking) TAKE 2 TABLETS BY MOUTH DAILY    atorvastatin (LIPITOR) 20 mg tablet  (Taking) Take 1 Tab by mouth daily. Lab Results   Component Value Date/Time    Hemoglobin A1c 6.5 04/17/2017 10:59 AM    Glucose 123 05/01/2017 12:21 PM    Creatinine 0.83 05/01/2017 12:21 PM    Microalbumin/Creat ratio (mg/g creat) 4 04/17/2017 10:59 AM    Microalbumin,urine random 1.50 04/17/2017 10:59 AM    Cholesterol, total 203 04/17/2017 10:59 AM    HDL Cholesterol 46 04/17/2017 10:59 AM    LDL, calculated 141.4 04/17/2017 10:59 AM    Triglyceride 78 04/17/2017 10:59 AM     Diabetic Foot and Eye Exam HM Status   Topic Date Due    Eye Exam  04/26/2017    Diabetic Foot Care  04/17/2018       Orders:  -     MICROALBUMIN, UR, RAND W/ MICROALBUMIN/CREA RATIO; Future  -     HEMOGLOBIN A1C WITH EAG; Future  -     METABOLIC PANEL, BASIC; Future  -     Hepatitis B vaccine, adult dosage, IM    3. Morbid obesity, unspecified obesity type [facility])  Assessment & Plan:  Improving, based on history, physical exam and review of pertinent labs, studies and medications; meds reconciled; push further with dietary changes. Facilitating nutrition evaluation. , .  Key Obesity Meds     Patient is on no anti-obesity meds.         Lab Results   Component Value Date/Time    Hemoglobin A1c 6.5 04/17/2017 10:59 AM    Glucose 123 05/01/2017 12:21 PM    Cholesterol, total 203 04/17/2017 10:59 AM    HDL Cholesterol 46 04/17/2017 10:59 AM    LDL, calculated 141.4 04/17/2017 10:59 AM    Triglyceride 78 04/17/2017 10:59 AM Sodium 142 05/01/2017 12:21 PM    Potassium 3.7 05/01/2017 12:21 PM    ALT (SGPT) 19 11/04/2016 09:56 AM    AST (SGOT) 7 11/04/2016 09:56 AM             4. Hyperlipidemia, unspecified hyperlipidemia type  Assessment & Plan:  Status likely to be improving with better compliance with medication and dietary changes. Will verify with labs prior to next visit  Key Antihyperlipidemia Meds             atorvastatin (LIPITOR) 20 mg tablet  (Taking) Take 1 Tab by mouth daily. Orders:  -     LIPID PANEL W/ REFLX DIRECT LDL; Future    5. Vitamin D deficiency  -     VITAMIN D, 25 HYDROXY; Future    6. Encounter for immunization  -     Hepatitis B vaccine, adult dosage, IM  -     INFLUENZA VIRUS VACCINE QUADRIVALENT, PRESERVATIVE FREE SYRINGE (26211)    The patient understands our medical plan. Alternatives have been explained and offered. The risks, benefits and significant side effects of all medications have been reviewed. All questions have been addressed. She is encouraged to employ the information provided in the after visit summary, which was reviewed. The patient  is to call if her condition worsens or fails to improve or if significant side effects are experienced. Follow-up Disposition:  Return in about 1 month (around 10/14/2017) for diabetes and blood pressure follow up, non fasting labs 1 week prior. Dragon medical dictation software was used for portions of this report. Unintended voice recognition errors may occur.

## 2017-09-14 NOTE — PROGRESS NOTES
Pt in office for follow up BP. 1. Have you been to the ER, urgent care clinic since your last visit? Hospitalized since your last visit? No    2. Have you seen or consulted any other health care providers outside of the [address] [place] [address] since your last visit? Include any pap smears or colon screening.  No'

## 2017-09-14 NOTE — ASSESSMENT & PLAN NOTE
No longer on metformin due to side effects, but with significant dietary changes as evidenced by 10 pound weight loss in the interim. Continue without medication at present. Facilitating nutrition appointment. Schedule eye exam  Key Antihyperglycemic Medications     Patient is on no antihyperglycemic meds. Other Key Diabetic Medications             enalapril-hydroCHLOROthiazide (VASERETIC) 10-25 mg tablet  (Taking) TAKE 2 TABLETS BY MOUTH DAILY    atorvastatin (LIPITOR) 20 mg tablet  (Taking) Take 1 Tab by mouth daily.         Lab Results   Component Value Date/Time    Hemoglobin A1c 6.5 04/17/2017 10:59 AM    Glucose 123 05/01/2017 12:21 PM    Creatinine 0.83 05/01/2017 12:21 PM    Microalbumin/Creat ratio (mg/g creat) 4 04/17/2017 10:59 AM    Microalbumin,urine random 1.50 04/17/2017 10:59 AM    Cholesterol, total 203 04/17/2017 10:59 AM    HDL Cholesterol 46 04/17/2017 10:59 AM    LDL, calculated 141.4 04/17/2017 10:59 AM    Triglyceride 78 04/17/2017 10:59 AM     Diabetic Foot and Eye Exam HM Status   Topic Date Due    Eye Exam  04/26/2017    Diabetic Foot Care  04/17/2018

## 2017-09-14 NOTE — ASSESSMENT & PLAN NOTE
Status likely to be improving with better compliance with medication and dietary changes. Will verify with labs prior to next visit  Key Antihyperlipidemia Meds             atorvastatin (LIPITOR) 20 mg tablet  (Taking) Take 1 Tab by mouth daily.

## 2017-09-18 ENCOUNTER — HOSPITAL ENCOUNTER (OUTPATIENT)
Dept: NUTRITION | Age: 47
Discharge: HOME | End: 2017-09-18
Payer: COMMERCIAL

## 2017-09-18 PROCEDURE — 97802 MEDICAL NUTRITION INDIV IN: CPT

## 2017-09-18 NOTE — PROGRESS NOTES
[facility] - Outpatient Nutrition Services  [address] [place] [address] W[place] Mao, [address] [address]  Phone: [phone]  Fax: [phone]   Nutrition Assessment - Medical Nutrition Therapy   Outpatient Initial Evaluation         Patient Name: Franck Greer : 1970   Treatment Diagnosis: Diabetes   Referral Source: Dorinda Vega MD [facility] of [facility]): 2017     Gender: female Age: 56 y.o. Ht: 65 in Wt:  249 lb  kg   BMI: 41.4 BMR   Male  BMR Female 1770   Anthropometrics Assessment: Per BMI, pt is considered morbidly obese. Moderate abdominal adiposity is evident based on visual observation     Past Medical History includes: High blood pressure, diabetes, obesity     Pertinent Medications:     Enaprail, atorvastatin, Vitamin D, vitamin B-12, biotin, mutli-vitamin,    Biochemical Data:   Lab Results   Component Value Date/Time    Hemoglobin A1c 6.5 2017 10:59 AM     Lab Results   Component Value Date/Time    Cholesterol, total 203 2017 10:59 AM    HDL Cholesterol 46 2017 10:59 AM    LDL, calculated 141.4 2017 10:59 AM    VLDL, calculated 15.6 2017 10:59 AM    Triglyceride 78 2017 10:59 AM    CHOL/HDL Ratio 4.4 2017 10:59 AM     Lab Results   Component Value Date/Time    ALT (SGPT) 19 2016 09:56 AM    AST (SGOT) 7 2016 09:56 AM    Alk. phosphatase 67 2016 09:56 AM    Bilirubin, total 0.4 2016 09:56 AM     Lab Results   Component Value Date/Time    Creatinine, POC 0.8 2013 09:39 AM    Creatinine 0.83 2017 12:21 PM     Lab Results   Component Value Date/Time    BUN 11 2017 12:21 PM     Lab Results   Component Value Date/Time    Microalbumin/Creat ratio (mg/g creat) 4 2017 10:59 AM    Microalbumin,urine random 1.50 2017 10:59 AM        Subjective/Assessment:   The patient has a past medical history of high blood pressure and diabetes.   She has recently lost 12 pounds which she said makes her feel empowered. Her last blood pressure was 137/80 which is a little high for her. She thinks her last A1c was 6.5%. She initially took Metformin but the pills made her feel sick to her stomach. She is a disabled  (she has had back surgery). She does work as an [employer]. She also takes care of her 8year old son so she does not work. She wakes up at 5:30 in the morning. She does take a 30 minute walk most mornings. She walks quickly because she is only has 30 minutes. Current Eating Patterns: Her meals often lack balance. She typically has a smoothie for breakfast and it's only veggies and smoothies (kale, spinach, arugula, grapes, apple, frozen spinach and 2 cups of water. It makes 2 servings. She usually drinks 1 for breakfast and 1 for lunch. She also snacks on 1 pouch of tuna, 28 almonds and hummus. She drinks mostly water but occasionally will have a beer or ginger-kati. She purposely tries to avoid cakes and sweets. Estimate Needs   Calories: 1700  Protein: 100 Carbs: 190 Fat: 57   Kcal/day  g/day  g/day  g/day        percent: 25  45  30               Education & Recommendations provided: Educated pt on the pathophysiology of Type II Diabetes, insulin resistance and the rationale for dietary modifications and increased activity. Educated pt on carbohydrate food sources, counting carbohydrates, label reading, meal timing, plate method and appropriate serving sizes. Encouraged pt to avoid sugary beverages and limit alcoholic beverages.   Encouraged patient to limit sodium intake to less kaiden   Handouts Provided: [x]  Carbohydrates  [x]  Protein  [x]  Fiber  [x]  Serving Sizes  [x]  Meal and Snack Ideas  []  Food Journals [x]  Diabetes  []  Cholesterol  [x]  Sodium  []  Gen Nutr Guidelines  []  SBGM Guidelines  []  Others:   Information Reviewed with: patient   Readiness to Change Stage: []  Pre-contemplative    []  Contemplative  [x]  Preparation               []  Action                  [] Maintenance   Potential Barriers to Learning: []  Decline in memory    []  Language barrier   []  Other:  []  Emotional                  []  Limited mobility  []  Lack of motivation     [] Vision, hearing or cognitive impairment   Expected Compliance: Expect excellence complaince due to patient already making changes     Nutritional Goal - To promote lifestyle changes to result in:    [x]  Weight loss  [x]  Improved diabetic control  []  Decreased cholesterol levels  []  Decreased blood pressure  []  Weight maintenance []  Preventing any interactions associated with food allergies  []  Adequate weight gain toward goal weight  []  Other:        Patient Goals:  SMART goals - Improve consistency of CHO intake w/goal to plan meals with no more than 45 gm CHO/meal and one snack of 25 gm. -Patient will use the plate method for meals  -Patient will count carbohydrates  - Pt will record food and beverage intake by using a food journal (Myfitnesspal) at least 3 days per week. Dietitian Signature: Bree Jose MS, BS, RN, RDN Date: 9/18/2017   Follow-up:  Will call to schedule  Time: 3:00 PM

## 2017-10-05 ENCOUNTER — LAB ONLY (OUTPATIENT)
Dept: FAMILY MEDICINE CLINIC | Age: 47
End: 2017-10-05

## 2017-10-05 ENCOUNTER — HOSPITAL ENCOUNTER (OUTPATIENT)
Dept: LAB | Age: 47
Discharge: HOME | End: 2017-10-05
Payer: COMMERCIAL

## 2017-10-05 DIAGNOSIS — E11.22 CONTROLLED TYPE 2 DIABETES MELLITUS WITH CHRONIC KIDNEY DISEASE, WITHOUT LONG-TERM CURRENT USE OF INSULIN, UNSPECIFIED CKD STAGE (HCC): ICD-10-CM

## 2017-10-05 DIAGNOSIS — E55.9 VITAMIN D DEFICIENCY: ICD-10-CM

## 2017-10-05 DIAGNOSIS — Z01.89 ROUTINE LAB DRAW: Primary | ICD-10-CM

## 2017-10-05 DIAGNOSIS — I10 ESSENTIAL HYPERTENSION: ICD-10-CM

## 2017-10-05 LAB
ANION GAP SERPL CALC-SCNC: 6 MMOL/L (ref 3–18)
BUN SERPL-MCNC: 11 MG/DL (ref 7–18)
BUN/CREAT SERPL: 14 (ref 12–20)
CALCIUM SERPL-MCNC: 8.8 MG/DL (ref 8.5–10.1)
CHLORIDE SERPL-SCNC: 103 MMOL/L (ref 100–108)
CHOLEST SERPL-MCNC: 154 MG/DL
CO2 SERPL-SCNC: 28 MMOL/L (ref 21–32)
CREAT SERPL-MCNC: 0.77 MG/DL (ref 0.6–1.3)
EST. AVERAGE GLUCOSE BLD GHB EST-MCNC: 131 MG/DL
GLUCOSE SERPL-MCNC: 98 MG/DL (ref 74–99)
HBA1C MFR BLD: 6.2 % (ref 4.2–5.6)
HDLC SERPL-MCNC: 50 MG/DL (ref 40–60)
HDLC SERPL: 3.1 {RATIO} (ref 0–5)
LDLC SERPL CALC-MCNC: 92.8 MG/DL (ref 0–100)
LIPID PROFILE,FLP: NORMAL
POTASSIUM SERPL-SCNC: 4 MMOL/L (ref 3.5–5.5)
SODIUM SERPL-SCNC: 137 MMOL/L (ref 136–145)
TRIGL SERPL-MCNC: 56 MG/DL (ref ?–150)
VLDLC SERPL CALC-MCNC: 11.2 MG/DL

## 2017-10-05 PROCEDURE — 80061 LIPID PANEL: CPT | Performed by: FAMILY MEDICINE

## 2017-10-05 PROCEDURE — 82306 VITAMIN D 25 HYDROXY: CPT | Performed by: FAMILY MEDICINE

## 2017-10-05 PROCEDURE — 83036 HEMOGLOBIN GLYCOSYLATED A1C: CPT | Performed by: FAMILY MEDICINE

## 2017-10-05 PROCEDURE — 82043 UR ALBUMIN QUANTITATIVE: CPT | Performed by: FAMILY MEDICINE

## 2017-10-05 PROCEDURE — 80048 BASIC METABOLIC PNL TOTAL CA: CPT | Performed by: FAMILY MEDICINE

## 2017-10-05 NOTE — PROGRESS NOTES
Patient came into office today for lab draw. Patient identified by name and date of birth. Performed venipuncture in patients left hand. Patient tolerated procedure well.

## 2017-10-06 LAB
25(OH)D3 SERPL-MCNC: 62.8 NG/ML (ref 30–100)
CREAT UR-MCNC: 397.37 MG/DL (ref 30–125)
MICROALBUMIN UR-MCNC: 1.4 MG/DL (ref 0–3)
MICROALBUMIN/CREAT UR-RTO: 4 MG/G (ref 0–30)

## 2017-10-19 ENCOUNTER — OFFICE VISIT (OUTPATIENT)
Dept: FAMILY MEDICINE CLINIC | Age: 47
End: 2017-10-19

## 2017-10-19 VITALS
BODY MASS INDEX: 41.67 KG/M2 | HEART RATE: 86 BPM | SYSTOLIC BLOOD PRESSURE: 130 MMHG | DIASTOLIC BLOOD PRESSURE: 94 MMHG | WEIGHT: 250.4 LBS | TEMPERATURE: 98.9 F | OXYGEN SATURATION: 97 % | RESPIRATION RATE: 12 BRPM

## 2017-10-19 DIAGNOSIS — E55.9 VITAMIN D DEFICIENCY: ICD-10-CM

## 2017-10-19 DIAGNOSIS — E78.5 HYPERLIPIDEMIA, UNSPECIFIED HYPERLIPIDEMIA TYPE: ICD-10-CM

## 2017-10-19 DIAGNOSIS — E11.21 CONTROLLED TYPE 2 DIABETES MELLITUS WITH DIABETIC NEPHROPATHY, WITHOUT LONG-TERM CURRENT USE OF INSULIN (HCC): Primary | ICD-10-CM

## 2017-10-19 DIAGNOSIS — I10 ESSENTIAL HYPERTENSION: ICD-10-CM

## 2017-10-19 DIAGNOSIS — E66.01 MORBID OBESITY (HCC): ICD-10-CM

## 2017-10-19 RX ORDER — ENALAPRIL MALEATE AND HYDROCHLOROTHIAZIDE 10; 25 MG/1; MG/1
TABLET ORAL
Qty: 180 TAB | Refills: 4 | Status: SHIPPED | OUTPATIENT
Start: 2017-10-19 | End: 2018-11-21 | Stop reason: SDUPTHER

## 2017-10-19 RX ORDER — ATORVASTATIN CALCIUM 20 MG/1
20 TABLET, FILM COATED ORAL DAILY
Qty: 90 TAB | Refills: 4 | Status: SHIPPED | OUTPATIENT
Start: 2017-10-19 | End: 2018-11-21 | Stop reason: SDUPTHER

## 2017-10-19 RX ORDER — ERGOCALCIFEROL 1.25 MG/1
50000 CAPSULE ORAL
Qty: 20 CAP | Refills: 4 | Status: SHIPPED | OUTPATIENT
Start: 2017-10-19 | End: 2020-05-08

## 2017-10-19 RX ORDER — AMLODIPINE BESYLATE 10 MG/1
10 TABLET ORAL DAILY
Qty: 90 TAB | Refills: 1 | Status: SHIPPED | OUTPATIENT
Start: 2017-10-19 | End: 2018-11-21 | Stop reason: SDUPTHER

## 2017-10-19 RX ORDER — BISMUTH SUBSALICYLATE 262 MG
1 TABLET,CHEWABLE ORAL DAILY
COMMUNITY

## 2017-10-19 NOTE — MR AVS SNAPSHOT
Visit Information Date & Time Provider Department Dept. Phone Encounter #  
 10/19/2017 11:15 AM Roseline Greenfield MD [facility] [phone] [id number] Follow-up Instructions Return in about 1 month (around 11/19/2017) for blood pressure nurse visit, 6 months diabetes follow up, lab plan to follow. Upcoming Health Maintenance Date Due  
 EYE EXAM RETINAL OR DILATED Q1 4/26/2017 PAP AKA CERVICAL CYTOLOGY 8/21/2017 HEMOGLOBIN A1C Q6M 4/5/2018 FOOT EXAM Q1 4/17/2018 MICROALBUMIN Q1 10/5/2018 LIPID PANEL Q1 10/5/2018 DTaP/Tdap/Td series (2 - Td) 9/3/2025 Allergies as of 10/19/2017  Review Complete On: 10/19/2017 By: Roseline Greenfield MD  
  
 Severity Noted Reaction Type Reactions Metformin  09/14/2017    Nausea and Vomiting Current Immunizations  Reviewed on 9/14/2017 Name Date Hep B Vaccine (Adult) 9/14/2017, 11/11/2016, 3/29/2016 Influenza Nasal Vaccine 10/14/2013 Influenza Vaccine (Quad) PF 9/14/2017, 11/11/2016, 9/3/2015 Influenza Vaccine Split 10/13/2011 Pneumococcal Polysaccharide (PPSV-23) 3/29/2016 Tdap 9/3/2015 Not reviewed this visit You Were Diagnosed With   
  
 Codes Comments Controlled type 2 diabetes mellitus with diabetic nephropathy, without long-term current use of insulin ([facility]    -  Primary ICD-10-CM: E11.21 
ICD-9-CM: 250.40, 583.81 Vitamin D deficiency     ICD-10-CM: E55.9 ICD-9-CM: 268.9 Essential hypertension     ICD-10-CM: I10 
ICD-9-CM: 401.9 Morbid obesity ([facility]     ICD-10-CM: E66.01 
ICD-9-CM: 278.01 Hyperlipidemia, unspecified hyperlipidemia type     ICD-10-CM: E78.5 ICD-9-CM: 272.4 Vitals BP Pulse Temp Resp Weight(growth percentile) SpO2  
 (!) 130/94 (BP 1 Location: Left arm, BP Patient Position: Sitting) 86 98.9 °F (37.2 °C) (Oral) 12 250 lb 6.4 oz (113.6 kg) 97% BMI OB Status Smoking Status 41.67 kg/m2 IUD Never Smoker Vitals History BMI and BSA Data Body Mass Index Body Surface Area  
 41.67 kg/m 2 2.28 m 2 Preferred Pharmacy Pharmacy Name Phone [facility]/PHARMACY [id number], [address] [place] [address] Your Updated Medication List  
  
   
This list is accurate as of: 10/19/17 12:12 PM.  Always use your most recent med list.  
  
  
  
  
 albuterol 90 mcg/actuation inhaler Commonly known as:  PROVENTIL HFA, VENTOLIN HFA, PROAIR HFA  
2 Puffs. amLODIPine 10 mg tablet Commonly known as:  Tad Misa Take 1 Tab by mouth daily. atorvastatin 20 mg tablet Commonly known as:  LIPITOR Take 1 Tab by mouth daily. enalapril-hydroCHLOROthiazide 10-25 mg tablet Commonly known as:  VASERETIC  
TAKE 2 TABLETS BY MOUTH DAILY  
  
 ergocalciferol 50,000 unit capsule Commonly known as:  VITAMIN D2 Take 1 Cap by mouth every seven (7) days. MIRENA 20 mcg/24 hr (5 years) IUD Generic drug:  levonorgestrel  
  
 multivitamin tablet Commonly known as:  ONE A DAY Take 1 Tab by mouth daily. VITAMIN B-12 PO Take  by mouth. Prescriptions Sent to Pharmacy Refills  
 ergocalciferol (VITAMIN D2) 50,000 unit capsule 4 Sig: Take 1 Cap by mouth every seven (7) days. Class: Normal  
 Pharmacy: [address] [place] [address] Mehdi Keen  Ph #: [phone] Route: Oral  
 enalapril-hydroCHLOROthiazide (VASERETIC) 10-25 mg tablet 4 Sig: TAKE 2 TABLETS BY MOUTH DAILY Class: Normal  
 Pharmacy: [facility]/pharmacy #[id number] - [address] Ph #: [phone]  
 amLODIPine (NORVASC) 10 mg tablet 1 Sig: Take 1 Tab by mouth daily. Class: Normal  
 Pharmacy: [address] [place] [address] Mehdi Keen  Ph #: [phone] Route: Oral  
 atorvastatin (LIPITOR) 20 mg tablet 4 Sig: Take 1 Tab by mouth daily.   
 Class: Normal  
 Pharmacy: [id number] [facility] [address]di [address]  #: [phone] Route: Oral  
  
Follow-up Instructions Return in about 1 month (around 11/19/2017) for blood pressure nurse visit, 6 months diabetes follow up, lab plan to follow. Introducing [facility] SERVICES! Dear Epi Blake: 
Thank you for requesting a [employer] account. Our records indicate that you already have an active [employer] account. You can access your account anytime at [email or web address] Did you know that you can access your hospital and ER discharge instructions at any time in [employer]? You can also review all of your test results from your hospital stay or ER visit. Additional Information If you have questions, please visit the Frequently Asked Questions section of the [employer] website at [email or web address] Remember, [employer] is NOT to be used for urgent needs. For medical emergencies, dial 911. Now available from your iPhone and Android! Please provide this summary of care documentation to your next provider. Your primary care clinician is listed as Jonnathan Malone. If you have any questions after today's visit, please call [phone].

## 2017-10-19 NOTE — PROGRESS NOTES
Tiara Pompa is a 56 y.o. female  Follow-up hypertension in the setting of DM 2 and morbid obesity on enalapril hydrochlorothiazide with recent addition of CCB.     Soc: met with nutrition. Added yogurt to smoothies, more sustaining. Prepping for a 5 k in the new year     feels \"great\"     Follow-up hyperlipidemia on statin. Asymptomatic  Follow-up vitamin D deficiency on replacement    Patient Care Team:  Lanie Sierra MD as PCP - General  Blanche Burks RN as Nurse Navigator  Allergies   Allergen Reactions    Metformin Nausea and Vomiting     Outpatient Prescriptions Marked as Taking for the 10/19/17 encounter (Office Visit) with Lanie Sierra MD   Medication Sig Dispense Refill    multivitamin (ONE A DAY) tablet Take 1 Tab by mouth daily.  amLODIPine (NORVASC) 5 mg tablet Take 1 Tab by mouth daily. 30 Tab 1    enalapril-hydroCHLOROthiazide (VASERETIC) 10-25 mg tablet TAKE 2 TABLETS BY MOUTH DAILY 60 Tab 1    MIRENA 20 mcg/24 hr (5 years) IUD       ergocalciferol (VITAMIN D2) 50,000 unit capsule Take 1 Cap by mouth two (2) times a week. 30 Cap 4    atorvastatin (LIPITOR) 20 mg tablet Take 1 Tab by mouth daily. 90 Tab 4    CYANOCOBALAMIN, VITAMIN B-12, (VITAMIN B-12 PO) Take  by mouth. Patient Active Problem List    Diagnosis    Controlled type 2 diabetes mellitus with diabetic nephropathy, without long-term current use of insulin ([address]     3/29/16: dx'ed. Increased metformin (started for prediabetes) to 2000 mg daily.  Hyperlipidemia     9/4/2015: ASCVD 10 year risk 2.2 %., lifetime 39.1% 3/29/16: dx DM2. Statin initiated.         Vitamin D deficiency    Essential hypertension    Morbid obesity ([address]     Past Medical History:   Diagnosis Date    Asthma     Blood transfusion 1979    Cervical dysplasia 1997    colposcopy surveillance, normal since    Depression     Hypertension     IUD (intrauterine device) in place 8/2/2012    Mirena    Morbid obesity ([address]      Past Surgical History:   Procedure Laterality Date    HX APPENDECTOMY      HX BACK SURGERY  2013    right L4-5 hemilaminectomy, cauda equina syndrome    HX GI      partial small intestine removed    HX GYN      left fallopian tube removed    HX GYN          HX GYN  2007        HX ORTHOPAEDIC  2004    fractured ankle     Family History   Problem Relation Age of Onset    Diabetes Mother     Hypertension Mother     Stroke Mother     Coronary Artery Disease Mother 54    Alcohol abuse Father     Hypertension Father     Diabetes Father     Heart Failure Father      CHF    Colon Cancer Paternal Uncle     Colon Cancer Maternal Grandfather     Ovarian Cancer Maternal Aunt      Social History     Social History    Marital status:      Spouse name: N/A    Number of children: N/A    Years of education: N/A     Occupational History    Not on file. Social History Main Topics    Smoking status: Never Smoker    Smokeless tobacco: Never Used    Alcohol use Yes      Comment: rarely    Drug use: No    Sexual activity: Yes     Partners: Male     Birth control/ protection: IUD     Other Topics Concern    Not on file     Social History Narrative         Review of Systems   Cardiovascular: Negative for leg swelling. Musculoskeletal: Negative for myalgias. Visit Vitals    BP (!) 130/94 (BP 1 Location: Left arm, BP Patient Position: Sitting)    Pulse 86    Temp 98.9 °F (37.2 °C) (Oral)    Resp 12    Wt 250 lb 6.4 oz (113.6 kg)    SpO2 97%    BMI 41.67 kg/m2     Physical Exam   Constitutional: She is oriented to person, place, and time. She appears well-developed. No distress. Pleasant morbidly obese black female   HENT:   Head: Normocephalic and atraumatic. Pulmonary/Chest: Effort normal.   Neurological: She is alert and oriented to person, place, and time. Psychiatric: She has a normal mood and affect.  Her behavior is normal. Judgment and thought content normal.        Lab Results   Component Value Date/Time    Hemoglobin A1c 6.2 10/05/2017 09:26 AM    Hemoglobin A1c 6.5 04/17/2017 10:59 AM    Hemoglobin A1c 5.7 11/04/2016 09:56 AM    Glucose 98 10/05/2017 09:26 AM    Microalbumin/Creat ratio (mg/g creat) 4 10/05/2017 09:26 AM    Microalbumin,urine random 1.40 10/05/2017 09:26 AM    LDL, calculated 92.8 10/05/2017 09:26 AM    Creatinine, POC 0.8 06/20/2013 09:39 AM    Creatinine 0.77 10/05/2017 09:26 AM       Results for orders placed or performed during the hospital encounter of 10/05/17   VITAMIN D, 25 HYDROXY   Result Value Ref Range    Vitamin D 25-Hydroxy 62.8 30 - 100 ng/mL   MICROALBUMIN, UR, RAND W/ MICROALBUMIN/CREA RATIO   Result Value Ref Range    Microalbumin,urine random 1.40 0 - 3.0 MG/DL    Creatinine, urine 397.37 (H) 30 - 125 mg/dL    Microalbumin/Creat ratio (mg/g creat) 4 0 - 30 mg/g   HEMOGLOBIN A1C WITH EAG   Result Value Ref Range    Hemoglobin A1c 6.2 (H) 4.2 - 5.6 %    Est. average glucose 810 mg/dL   METABOLIC PANEL, BASIC   Result Value Ref Range    Sodium 137 136 - 145 mmol/L    Potassium 4.0 3.5 - 5.5 mmol/L    Chloride 103 100 - 108 mmol/L    CO2 28 21 - 32 mmol/L    Anion gap 6 3.0 - 18 mmol/L    Glucose 98 74 - 99 mg/dL    BUN 11 7.0 - 18 MG/DL    Creatinine 0.77 0.6 - 1.3 MG/DL    BUN/Creatinine ratio 14 12 - 20      GFR est AA >60 >60 ml/min/1.73m2    GFR est non-AA >60 >60 ml/min/1.73m2    Calcium 8.8 8.5 - 10.1 MG/DL   LIPID PANEL W/ REFLX DIRECT LDL   Result Value Ref Range    LIPID PROFILE          Cholesterol, total 154 <200 MG/DL    Triglyceride 56 <150 MG/DL    HDL Cholesterol 50 40 - 60 MG/DL    LDL, calculated 92.8 0 - 100 MG/DL    VLDL, calculated 11.2 MG/DL    CHOL/HDL Ratio 3.1 0 - 5.0             ICD-10-CM ICD-9-CM    1. Controlled type 2 diabetes mellitus with diabetic nephropathy, without long-term current use of insulin (HCC) E11.21 250.40      583.81    2.  Vitamin D deficiency E55.9 268.9 ergocalciferol (VITAMIN D2) 50,000 unit capsule   3. Essential hypertension I10 401.9 enalapril-hydroCHLOROthiazide (VASERETIC) 10-25 mg tablet      amLODIPine (NORVASC) 10 mg tablet   4. Morbid obesity ([address] E66.01 278.01    5. Hyperlipidemia, unspecified hyperlipidemia type E78.5 272.4 atorvastatin (LIPITOR) 20 mg tablet         DM2: Remains well controlled without medication. Good job! Vit d: decrease replacement to once weekly  Hypertension: uncontrolled. Increase CCB. Continue others unchanged  Morbid obesity: losing weight. Keep it up. Lipids: improved, well controlled continue present management      Obtaining eye exam and pap    The patient understands our medical plan. Alternatives have been explained and offered. The risks, benefits and significant side effects of all medications have been reviewed. Anticipated time course and progression of condition reviewed. All questions have been addressed. She is encouraged to employ the information provided in the after visit summary, which was reviewed. She is instructed to call the clinic if she has not been notified either by phone or through [address] with the results of her tests or with an appointment plan for any referrals within 1 week(s). No news is not good news; it's no news. The patient  is to call if her condition worsens or fails to improve or if significant side effects are experienced. Follow-up Disposition:  Return in about 1 month (around 11/19/2017) for blood pressure nurse visit, 6 months diabetes follow up, lab plan to follow. Labs: A1c, microalb, cmp    Dragon medical dictation software was used for portions of this report. Unintended voice recognition errors may occur.

## 2017-10-19 NOTE — PROGRESS NOTES
Patient here for f/u on her HTN and DM. 1. Have you been to the ER, urgent care clinic since your last visit? Hospitalized since your last visit? No    2. Have you seen or consulted any other health care providers outside of the [address] [place] [address] since your last visit? Include any pap smears or colon screening. No  Health Maintenance reviewed - Had pap done with GYN will request report, she has seen her eye doctor will request report as well.

## 2017-11-03 ENCOUNTER — TELEPHONE (OUTPATIENT)
Dept: FAMILY MEDICINE CLINIC | Age: 47
End: 2017-11-03

## 2017-11-03 NOTE — TELEPHONE ENCOUNTER
Notes from office of Mike Madsen, OD indicate never completed dilated eye exam. I cannot stress enough how important it is for her to have annual exams to find and address any issues long before they threaten her sight. Please call, refer her back to this office or elsewhere if she prefers. Then check on it again when she returns later this month for a BP check.     HAL

## 2017-11-06 NOTE — TELEPHONE ENCOUNTER
Patient called back informed her of the message. Patient verbalized understanding and will call there office to schedule appointment.

## 2017-11-16 ENCOUNTER — CLINICAL SUPPORT (OUTPATIENT)
Dept: FAMILY MEDICINE CLINIC | Age: 47
End: 2017-11-16

## 2017-11-16 VITALS — SYSTOLIC BLOOD PRESSURE: 128 MMHG | DIASTOLIC BLOOD PRESSURE: 88 MMHG

## 2017-11-16 DIAGNOSIS — I10 ESSENTIAL HYPERTENSION: ICD-10-CM

## 2017-11-16 DIAGNOSIS — E11.21 CONTROLLED TYPE 2 DIABETES MELLITUS WITH DIABETIC NEPHROPATHY, WITHOUT LONG-TERM CURRENT USE OF INSULIN (HCC): ICD-10-CM

## 2017-11-16 DIAGNOSIS — Z01.30 BLOOD PRESSURE CHECK: Primary | ICD-10-CM

## 2017-11-16 NOTE — PROGRESS NOTES
Patient came into the office today for blood pressure check. BP right arm sitting was 128/88. Patient stated she feels good since the medication increase. Advised patient that if she feels like her b/p is elevated, headache, to please call the office and make a nurse visit for bp check. Patient verbalized understanding and stated per Dr. Mansoor Shearer if bp was ok she is not due to come back until 6 months. Encouraged patient if at anytime she feels like b/p might be high just call and we will see her.

## 2017-11-17 NOTE — PROGRESS NOTES
Blood pressure improving. Diastolic still not well controlled. Continue medications unchanged at present. Continue focus on dietary changes: Increasing fresh fruits and vegetables, decreasing processed foods, moderation in salt and sugar, decreasing portion sizes. These choices in addition to regular exercise will also improve blood pressure even if it does not seem to be producing changes in weight. Plan office with me in 5 months with nonfasting labs one week prior.   HAL

## 2018-12-03 ENCOUNTER — LAB ONLY (OUTPATIENT)
Dept: FAMILY MEDICINE CLINIC | Age: 48
End: 2018-12-03

## 2018-12-03 ENCOUNTER — HOSPITAL ENCOUNTER (OUTPATIENT)
Dept: LAB | Age: 48
Discharge: HOME | End: 2018-12-03
Payer: COMMERCIAL

## 2018-12-03 DIAGNOSIS — I10 ESSENTIAL HYPERTENSION: Primary | ICD-10-CM

## 2018-12-03 DIAGNOSIS — E11.21 CONTROLLED TYPE 2 DIABETES MELLITUS WITH DIABETIC NEPHROPATHY, WITHOUT LONG-TERM CURRENT USE OF INSULIN (HCC): ICD-10-CM

## 2018-12-03 DIAGNOSIS — I10 ESSENTIAL HYPERTENSION: ICD-10-CM

## 2018-12-03 DIAGNOSIS — E78.5 HYPERLIPIDEMIA, UNSPECIFIED HYPERLIPIDEMIA TYPE: ICD-10-CM

## 2018-12-03 LAB
ALBUMIN SERPL-MCNC: 3.8 G/DL (ref 3.4–5)
ALBUMIN/GLOB SERPL: 1.1 {RATIO} (ref 0.8–1.7)
ALP SERPL-CCNC: 62 U/L (ref 45–117)
ALT SERPL-CCNC: 18 U/L (ref 13–56)
ANION GAP SERPL CALC-SCNC: 7 MMOL/L (ref 3–18)
APPEARANCE UR: CLEAR
AST SERPL-CCNC: 7 U/L (ref 15–37)
BILIRUB SERPL-MCNC: 0.5 MG/DL (ref 0.2–1)
BILIRUB UR QL: NEGATIVE
BUN SERPL-MCNC: 10 MG/DL (ref 7–18)
BUN/CREAT SERPL: 13 (ref 12–20)
CALCIUM SERPL-MCNC: 8.9 MG/DL (ref 8.5–10.1)
CHLORIDE SERPL-SCNC: 103 MMOL/L (ref 100–108)
CHOLEST SERPL-MCNC: 159 MG/DL
CO2 SERPL-SCNC: 28 MMOL/L (ref 21–32)
COLOR UR: YELLOW
CREAT SERPL-MCNC: 0.79 MG/DL (ref 0.6–1.3)
EST. AVERAGE GLUCOSE BLD GHB EST-MCNC: 128 MG/DL
GLOBULIN SER CALC-MCNC: 3.4 G/DL (ref 2–4)
GLUCOSE SERPL-MCNC: 108 MG/DL (ref 74–99)
GLUCOSE UR STRIP.AUTO-MCNC: NEGATIVE MG/DL
HBA1C MFR BLD: 6.1 % (ref 4.2–5.6)
HDLC SERPL-MCNC: 55 MG/DL (ref 40–60)
HDLC SERPL: 2.9 {RATIO} (ref 0–5)
HGB UR QL STRIP: NEGATIVE
KETONES UR QL STRIP.AUTO: NEGATIVE MG/DL
LDLC SERPL CALC-MCNC: 92.8 MG/DL (ref 0–100)
LEUKOCYTE ESTERASE UR QL STRIP.AUTO: NEGATIVE
LIPID PROFILE,FLP: NORMAL
NITRITE UR QL STRIP.AUTO: NEGATIVE
PH UR STRIP: 6 [PH] (ref 5–8)
POTASSIUM SERPL-SCNC: 4 MMOL/L (ref 3.5–5.5)
PROT SERPL-MCNC: 7.2 G/DL (ref 6.4–8.2)
PROT UR STRIP-MCNC: NEGATIVE MG/DL
SODIUM SERPL-SCNC: 138 MMOL/L (ref 136–145)
SP GR UR REFRACTOMETRY: 1.02 (ref 1–1.03)
TRIGL SERPL-MCNC: 56 MG/DL (ref ?–150)
UROBILINOGEN UR QL STRIP.AUTO: 0.2 EU/DL (ref 0.2–1)
VLDLC SERPL CALC-MCNC: 11.2 MG/DL

## 2018-12-03 PROCEDURE — 83036 HEMOGLOBIN GLYCOSYLATED A1C: CPT

## 2018-12-03 PROCEDURE — 82043 UR ALBUMIN QUANTITATIVE: CPT

## 2018-12-03 PROCEDURE — 81003 URINALYSIS AUTO W/O SCOPE: CPT

## 2018-12-03 PROCEDURE — 80061 LIPID PANEL: CPT

## 2018-12-03 PROCEDURE — 80053 COMPREHEN METABOLIC PANEL: CPT

## 2018-12-03 NOTE — PROGRESS NOTES
Patient here for fasting lab draw name and  verified venipuncture performed on patient's right arm was successful patient tolerated well.

## 2018-12-04 LAB
CREAT UR-MCNC: 108 MG/DL (ref 30–125)
MICROALBUMIN UR-MCNC: 0.53 MG/DL (ref 0–3)
MICROALBUMIN/CREAT UR-RTO: 5 MG/G (ref 0–30)

## 2018-12-11 ENCOUNTER — OFFICE VISIT (OUTPATIENT)
Dept: FAMILY MEDICINE CLINIC | Age: 48
End: 2018-12-11

## 2018-12-11 VITALS
WEIGHT: 275 LBS | HEART RATE: 67 BPM | HEIGHT: 65 IN | DIASTOLIC BLOOD PRESSURE: 100 MMHG | BODY MASS INDEX: 45.82 KG/M2 | RESPIRATION RATE: 14 BRPM | OXYGEN SATURATION: 97 % | SYSTOLIC BLOOD PRESSURE: 158 MMHG | TEMPERATURE: 98.3 F

## 2018-12-11 DIAGNOSIS — E78.5 HYPERLIPIDEMIA, UNSPECIFIED HYPERLIPIDEMIA TYPE: ICD-10-CM

## 2018-12-11 DIAGNOSIS — G47.9 DISORDERED SLEEP: ICD-10-CM

## 2018-12-11 DIAGNOSIS — E11.21 CONTROLLED TYPE 2 DIABETES MELLITUS WITH DIABETIC NEPHROPATHY, WITHOUT LONG-TERM CURRENT USE OF INSULIN (HCC): ICD-10-CM

## 2018-12-11 DIAGNOSIS — E66.01 MORBID OBESITY (HCC): ICD-10-CM

## 2018-12-11 DIAGNOSIS — I10 ESSENTIAL HYPERTENSION: Primary | ICD-10-CM

## 2018-12-11 RX ORDER — CHLORTHALIDONE 25 MG/1
25 TABLET ORAL DAILY
Qty: 30 TAB | Refills: 1 | Status: SHIPPED | OUTPATIENT
Start: 2018-12-11 | End: 2019-02-04 | Stop reason: SDUPTHER

## 2018-12-11 RX ORDER — ENALAPRIL MALEATE AND HYDROCHLOROTHIAZIDE 10; 25 MG/1; MG/1
TABLET ORAL
Qty: 60 TAB | Refills: 0 | Status: CANCELLED | OUTPATIENT
Start: 2018-12-11

## 2018-12-11 RX ORDER — ENALAPRIL MALEATE 20 MG/1
40 TABLET ORAL DAILY
Qty: 60 TAB | Refills: 1 | Status: SHIPPED | OUTPATIENT
Start: 2018-12-11 | End: 2019-02-04 | Stop reason: SDUPTHER

## 2018-12-11 RX ORDER — AMLODIPINE BESYLATE 10 MG/1
TABLET ORAL
Qty: 90 TAB | Refills: 4 | Status: SHIPPED | OUTPATIENT
Start: 2018-12-11 | End: 2019-07-17 | Stop reason: SDUPTHER

## 2018-12-11 RX ORDER — ATORVASTATIN CALCIUM 20 MG/1
TABLET, FILM COATED ORAL
Qty: 90 TAB | Refills: 4 | Status: SHIPPED | OUTPATIENT
Start: 2018-12-11 | End: 2019-07-17 | Stop reason: SDUPTHER

## 2018-12-11 NOTE — PROGRESS NOTES
Robin Ramon is a 53 y.o. female who was seen in clinic today (12/11/2018). Assessment & Plan:       ICD-10-CM ICD-9-CM    1. Essential hypertension I10 401.9 amLODIPine (NORVASC) 10 mg tablet      SLEEP MEDICINE REFERRAL      enalapril (VASOTEC) 20 mg tablet      chlorthalidone (HYGROTEN) 25 mg tablet      METABOLIC PANEL, BASIC   2. Hyperlipidemia, unspecified hyperlipidemia type E78.5 272.4 atorvastatin (LIPITOR) 20 mg tablet   3. Disordered sleep G47.9 780.50 SLEEP MEDICINE REFERRAL   4. Morbid obesity ([facility] E66.01 278.01 REFERRAL TO BARIATRIC SURGERY   5. Controlled type 2 diabetes mellitus with diabetic nephropathy, without long-term current use of insulin ([facility]) E11.21 250.40 REFERRAL TO OPTOMETRY     583.81  DIABETES FOOT EXAM     Hypertension: Uncontrolled despite 3 agents  That in conjunction with disordered sleep, morbid obesity and large neck size highly suggestive of MAZIN  Pending evaluation and treatment for that: Continue CCB unchanged, increase ARB dose, switch thiazide to more potent agent. Anticipate adding a fourth agent and follow-up    Hyperlipidemia: Stable, well-controlled. Continue present management  DM 2: Remains uncomplicated. Schedule eye exam  Morbid obesity underpins all of this. Referring      Follow-up Disposition:  Return in about 1 month (around 1/11/2019) for blood pressure follow up, non fasting labs prior. Subjective:   Robin Ramon was seen today for Diabetes and Hypertension    Follow-up hypertension: on ARB, thiazide, CCB. Compliant. Asymptomatic    Asleep 10:30-11. Wakes at 2:30, up to 5:30, sleep x 1 hour. Unclear trigger. 5 hours. Throat discomfort 3x/week    Follow-up morbid obesity: Continues to gain weight.   [place] concerns but is interested in learning more about bariatric surgical options    Follow-up diet-controlled diabetes  Follow-up hyperlipidemia      Results for orders placed or performed during the hospital encounter of 12/03/18 HEMOGLOBIN A1C WITH EAG   Result Value Ref Range    Hemoglobin A1c 6.1 (H) 4.2 - 5.6 %    Est. average glucose 128 mg/dL   LIPID PANEL W/ REFLX DIRECT LDL   Result Value Ref Range    LIPID PROFILE          Cholesterol, total 159 <200 MG/DL    Triglyceride 56 <150 MG/DL    HDL Cholesterol 55 40 - 60 MG/DL    LDL, calculated 92.8 0 - 100 MG/DL    VLDL, calculated 11.2 MG/DL    CHOL/HDL Ratio 2.9 0 - 5.0     METABOLIC PANEL, COMPREHENSIVE   Result Value Ref Range    Sodium 138 136 - 145 mmol/L    Potassium 4.0 3.5 - 5.5 mmol/L    Chloride 103 100 - 108 mmol/L    CO2 28 21 - 32 mmol/L    Anion gap 7 3.0 - 18 mmol/L    Glucose 108 (H) 74 - 99 mg/dL    BUN 10 7.0 - 18 MG/DL    Creatinine 0.79 0.6 - 1.3 MG/DL    BUN/Creatinine ratio 13 12 - 20      GFR est AA >60 >60 ml/min/1.73m2    GFR est non-AA >60 >60 ml/min/1.73m2    Calcium 8.9 8.5 - 10.1 MG/DL    Bilirubin, total 0.5 0.2 - 1.0 MG/DL    ALT (SGPT) 18 13 - 56 U/L    AST (SGOT) 7 (L) 15 - 37 U/L    Alk.  phosphatase 62 45 - 117 U/L    Protein, total 7.2 6.4 - 8.2 g/dL    Albumin 3.8 3.4 - 5.0 g/dL    Globulin 3.4 2.0 - 4.0 g/dL    A-G Ratio 1.1 0.8 - 1.7     MICROALBUMIN, UR, RAND W/ MICROALB/CREAT RATIO   Result Value Ref Range    Microalbumin,urine random 0.53 0 - 3.0 MG/DL    Creatinine, urine 108.00 30 - 125 mg/dL    Microalbumin/Creat ratio (mg/g creat) 5 0 - 30 mg/g   URINALYSIS W/ RFLX MICROSCOPIC   Result Value Ref Range    Color YELLOW      Appearance CLEAR      Specific gravity 1.016 1.005 - 1.030      pH (UA) 6.0 5.0 - 8.0      Protein NEGATIVE  NEG mg/dL    Glucose NEGATIVE  NEG mg/dL    Ketone NEGATIVE  NEG mg/dL    Bilirubin NEGATIVE  NEG      Blood NEGATIVE  NEG      Urobilinogen 0.2 0.2 - 1.0 EU/dL    Nitrites NEGATIVE  NEG      Leukocyte Esterase NEGATIVE  NEG        Lab Results   Component Value Date/Time    TSH 1.69 03/22/2016 11:21 AM       Outpatient Medications Marked as Taking for the 12/11/18 encounter (Office Visit) with Skye Spencer MD Medication Sig Dispense Refill    enalapril-hydroCHLOROthiazide (VASERETIC) 10-25 mg tablet TAKE TWO TABLETS BY MOUTH DAILY 60 Tab 0    amLODIPine (NORVASC) 10 mg tablet TAKE ONE TABLET BY MOUTH DAILY 30 Tab 0    atorvastatin (LIPITOR) 20 mg tablet TAKE ONE TABLET BY MOUTH DAILY 30 Tab 0    multivitamin (ONE A DAY) tablet Take 1 Tab by mouth daily.  ergocalciferol (VITAMIN D2) 50,000 unit capsule Take 1 Cap by mouth every seven (7) days. 20 Cap 4    MIRENA 20 mcg/24 hr (5 years) IUD          Patient Active Problem List    Diagnosis    Controlled type 2 diabetes mellitus with diabetic nephropathy, without long-term current use of insulin ([facility]     3/29/16: dx'ed. Increased metformin (started for prediabetes) to 2000 mg daily.  Hyperlipidemia     9/4/2015: ASCVD 10 year risk 2.2 %., lifetime 39.1% 3/29/16: dx DM2. Statin initiated.  Vitamin D deficiency    Essential hypertension    Morbid obesity ([facility]         Allergies   Allergen Reactions    Metformin Nausea and Vomiting         Patient Care Team:  Mary Kay Sanz MD as PCP - General  Brea Hung RN as Nurse Navigator    The following sections were reviewed & updated as appropriate: PMH, PSH, FH, and SH. Review of Systems   Constitutional: Negative for malaise/fatigue. Respiratory: Negative for shortness of breath. Cardiovascular: Negative for chest pain. Neurological: Negative for sensory change, speech change, focal weakness and headaches. Objective:     Visit Vitals  BP (!) 158/100 (BP 1 Location: Right arm, BP Patient Position: Sitting)   Pulse 67   Temp 98.3 °F (36.8 °C) (Oral)   Resp 14   Ht 5' 5\" (1.651 m)   Wt 275 lb (124.7 kg)   SpO2 97%   BMI 45.76 kg/m²      Physical Exam   Constitutional: She is oriented to person, place, and time. She appears well-developed. No distress. Pleasant morbidly obese black female   HENT:   Head: Normocephalic and atraumatic.    Neck:   16\"   Pulmonary/Chest: Effort normal.   Neurological: She is alert and oriented to person, place, and time. Psychiatric: She has a normal mood and affect. Her behavior is normal. Judgment and thought content normal.     Diabetic foot exam:     Left Foot:   Visual Exam:tinea, otherwise  normal    Pulse DP: 2+ (normal)   Filament test: normal sensation          Right Foot:   Visual Exam:tinea otherwise  normal    Pulse DP: 2+ (normal)   Filament test: normal sensation          Disclaimer: The patient understands our medical plan. Alternatives have been explained and offered. The risks, benefits and significant side effects of all medications have been reviewed. Anticipated time course and progression of condition reviewed. All questions have been addressed. She is encouraged to employ the information provided in the after visit summary, which was reviewed. Where appropriate, she is instructed to call the clinic if she has not been notified either by phone or through [address] with the results of her tests or with an appointment plan for any referrals within 1 week(s). No news is not good news; it's no news. The patient  is to call if her condition worsens or fails to improve or if significant side effects are experienced. Aspects of this note may have been generated using voice recognition software. Despite editing, there may be unrecognized errors.        Giselle Woodward MD

## 2018-12-11 NOTE — PROGRESS NOTES
Sophia Lomax 53 y.o. female being seen for   Chief Complaint   Patient presents with    Diabetes    Hypertension     No concerns   No headache   No blurry vision     1. Have you been to the ER, urgent care clinic since your last visit? Hospitalized since your last visit? No    2. Have you seen or consulted any other health care providers outside of the [address] [place] [address] since your last visit? Include any pap smears or colon screening.  No    Pharmacy has been verified  Care team has been verified/updated

## 2019-01-04 ENCOUNTER — HOSPITAL ENCOUNTER (OUTPATIENT)
Dept: LAB | Age: 49
Discharge: HOME | End: 2019-01-04
Payer: COMMERCIAL

## 2019-01-04 ENCOUNTER — LAB ONLY (OUTPATIENT)
Dept: FAMILY MEDICINE CLINIC | Age: 49
End: 2019-01-04

## 2019-01-04 DIAGNOSIS — I10 ESSENTIAL HYPERTENSION: Primary | ICD-10-CM

## 2019-01-04 DIAGNOSIS — I10 ESSENTIAL HYPERTENSION: ICD-10-CM

## 2019-01-04 LAB
ANION GAP SERPL CALC-SCNC: 5 MMOL/L (ref 3–18)
BUN SERPL-MCNC: 9 MG/DL (ref 7–18)
BUN/CREAT SERPL: 12 (ref 12–20)
CALCIUM SERPL-MCNC: 8.4 MG/DL (ref 8.5–10.1)
CHLORIDE SERPL-SCNC: 106 MMOL/L (ref 100–108)
CO2 SERPL-SCNC: 27 MMOL/L (ref 21–32)
CREAT SERPL-MCNC: 0.75 MG/DL (ref 0.6–1.3)
GLUCOSE SERPL-MCNC: 125 MG/DL (ref 74–99)
POTASSIUM SERPL-SCNC: 4.3 MMOL/L (ref 3.5–5.5)
SODIUM SERPL-SCNC: 138 MMOL/L (ref 136–145)

## 2019-01-04 PROCEDURE — 80048 BASIC METABOLIC PNL TOTAL CA: CPT

## 2019-01-04 NOTE — PROGRESS NOTES
Patient here for lab draw only initial attempt by Dema Goldberg, LPN x 2 left arm was not successful I was asked to try. Name and  verified venipuncture performed on patient's right arm was successful patient tolerated well.

## 2019-01-11 ENCOUNTER — OFFICE VISIT (OUTPATIENT)
Dept: FAMILY MEDICINE CLINIC | Age: 49
End: 2019-01-11

## 2019-01-11 VITALS — SYSTOLIC BLOOD PRESSURE: 132 MMHG | DIASTOLIC BLOOD PRESSURE: 84 MMHG

## 2019-01-11 DIAGNOSIS — I10 ESSENTIAL HYPERTENSION: Primary | ICD-10-CM

## 2019-01-11 NOTE — PROGRESS NOTES
Chief Complaint   Patient presents with    Blood Pressure Check     Pt in office for BP check. Pt sat for 10 min. BP w/in normal limits.

## 2019-02-03 DIAGNOSIS — I10 ESSENTIAL HYPERTENSION: Primary | ICD-10-CM

## 2019-02-03 DIAGNOSIS — E78.5 HYPERLIPIDEMIA, UNSPECIFIED HYPERLIPIDEMIA TYPE: ICD-10-CM

## 2019-02-03 DIAGNOSIS — E66.01 MORBID OBESITY (HCC): ICD-10-CM

## 2019-02-04 DIAGNOSIS — I10 ESSENTIAL HYPERTENSION: ICD-10-CM

## 2019-02-04 RX ORDER — ENALAPRIL MALEATE 20 MG/1
40 TABLET ORAL DAILY
Qty: 180 TAB | Refills: 1 | Status: SHIPPED | OUTPATIENT
Start: 2019-02-04 | End: 2019-09-12 | Stop reason: SDUPTHER

## 2019-02-04 RX ORDER — ATORVASTATIN CALCIUM 20 MG/1
TABLET, FILM COATED ORAL
Qty: 30 TAB | Refills: 0 | OUTPATIENT
Start: 2019-02-04

## 2019-02-04 RX ORDER — CHLORTHALIDONE 25 MG/1
25 TABLET ORAL DAILY
Qty: 90 TAB | Refills: 1 | Status: SHIPPED | OUTPATIENT
Start: 2019-02-04 | End: 2019-09-12 | Stop reason: SDUPTHER

## 2019-02-04 NOTE — TELEPHONE ENCOUNTER
Automated refill request for atorvastatin, which had already ordered. Chart review showed however that she would have been running out of 2 of her BP meds when short supply ordered during titration. Those have been ordered. I'd referred her for suspected MAZIN. Scheduled? Did she have a flu shot this season? If so, document as historical. If not, and she's not planning to have it, document through HM as patient refused. Plan OV with me in 5 months for BP follow up. Non fasting labs prior.    HAL

## 2019-02-08 ENCOUNTER — HOSPITAL ENCOUNTER (OUTPATIENT)
Dept: MAMMOGRAPHY | Age: 49
Discharge: HOME | End: 2019-02-08
Attending: FAMILY MEDICINE
Payer: COMMERCIAL

## 2019-02-08 DIAGNOSIS — Z12.31 VISIT FOR SCREENING MAMMOGRAM: ICD-10-CM

## 2019-02-08 PROCEDURE — 77063 BREAST TOMOSYNTHESIS BI: CPT

## 2019-02-11 DIAGNOSIS — R92.2 INCONCLUSIVE MAMMOGRAM: Primary | ICD-10-CM

## 2019-02-26 ENCOUNTER — HOSPITAL ENCOUNTER (OUTPATIENT)
Dept: ULTRASOUND IMAGING | Age: 49
Discharge: HOME | End: 2019-02-26
Attending: FAMILY MEDICINE
Payer: COMMERCIAL

## 2019-02-26 ENCOUNTER — HOSPITAL ENCOUNTER (OUTPATIENT)
Dept: MAMMOGRAPHY | Age: 49
Discharge: HOME | End: 2019-02-26
Attending: FAMILY MEDICINE
Payer: COMMERCIAL

## 2019-02-26 DIAGNOSIS — R92.2 INCONCLUSIVE MAMMOGRAM: ICD-10-CM

## 2019-02-26 PROCEDURE — 77061 BREAST TOMOSYNTHESIS UNI: CPT

## 2019-02-26 PROCEDURE — 76642 ULTRASOUND BREAST LIMITED: CPT

## 2019-02-26 NOTE — PROGRESS NOTES
Reassuring results, Chris Hassan. Remember that no test is perfect. Plan to be seen if you should develop any breast symptoms such as pain, skin changes or masses. Take good care.  HAL

## 2019-03-01 ENCOUNTER — TELEPHONE (OUTPATIENT)
Dept: FAMILY MEDICINE CLINIC | Age: 49
End: 2019-03-01

## 2019-03-01 NOTE — TELEPHONE ENCOUNTER
Called patient back phone went straight to voicemail left message letting her know I was returning her call asked that she call the office back, office number has been left. Patient needs to schedule f/u on her blood pressure in 5 months with Dr. Marilu Burgess and is to have non-fasting labs drawn prior.

## 2019-03-04 NOTE — TELEPHONE ENCOUNTER
Called patient had her verify her  she has been made aware of her refills and has been scheduled for her follow up in August with non fasting labs the week prior.

## 2019-03-04 NOTE — TELEPHONE ENCOUNTER
Called patient had her verify her  she has been made aware her Atorvastatin has been sent in as well as her blood pressure medications. She has been scheduled for her follow up in August with non fasting labs prior.

## 2019-05-28 ENCOUNTER — OFFICE VISIT (OUTPATIENT)
Dept: FAMILY MEDICINE CLINIC | Age: 49
End: 2019-05-28

## 2019-05-28 VITALS
WEIGHT: 276 LBS | RESPIRATION RATE: 12 BRPM | DIASTOLIC BLOOD PRESSURE: 86 MMHG | TEMPERATURE: 97.9 F | SYSTOLIC BLOOD PRESSURE: 134 MMHG | HEART RATE: 89 BPM | HEIGHT: 65 IN | BODY MASS INDEX: 45.98 KG/M2 | OXYGEN SATURATION: 97 %

## 2019-05-28 DIAGNOSIS — A09 TRAVELER'S DIARRHEA: Primary | ICD-10-CM

## 2019-05-28 DIAGNOSIS — E86.0 MILD DEHYDRATION: ICD-10-CM

## 2019-05-28 RX ORDER — LOPERAMIDE HYDROCHLORIDE 2 MG/1
4 CAPSULE ORAL ONCE
Qty: 20 CAP | Refills: 0 | COMMUNITY
Start: 2019-05-28 | End: 2020-05-08

## 2019-05-28 NOTE — PROGRESS NOTES
Chief Complaint   Patient presents with    Diarrhea     Pt in office c/o diarrhea. Pt states that she was in the [facility] last week. States that she began w/abdominal pain and watery diarrhea. States that she did eat raw conch salad. States she does feel better, but still has the diarrhea and feels off balance. 1. Have you been to the ER, urgent care clinic since your last visit? Hospitalized since your last visit? No    2. Have you seen or consulted any other health care providers outside of the [address] [place] [address] since your last visit? Include any pap smears or colon screening.  No

## 2019-05-28 NOTE — PATIENT INSTRUCTIONS
Probiotics specifically targeting the health of the gastrointestinal (GI) tract include:    Align, [employer], TruNature, Florastor. These are over the counter products. Follow the 's instructions. It may take several weeks to appreciate a benefit. Traveler's Diarrhea: Care Instructions  Your Care Instructions    Traveler's diarrhea is loose, watery bowel movements you can get when you travel. It also can cause vomiting and belly cramps. This kind of diarrhea is usually caused by bacteria. But sometimes it is caused by a parasite or virus. Most people get it when they eat undercooked, raw, or contaminated foods. You can also get it if you drink contaminated water or if you drink something that has contaminated ice cubes in it. In some cases, new foods can cause diarrhea. In other cases, the stress and anxiety of travel can cause it. Traveler's diarrhea usually isn't serious. Most of the time, bowel movements return to normal quickly. The most important thing is to prevent dehydration. Make sure to drink a lot of fluids. Follow-up care is a key part of your treatment and safety. Be sure to make and go to all appointments, and call your doctor if you are having problems. It's also a good idea to know your test results and keep a list of the medicines you take. How can you care for yourself at home? · Watch for signs of dehydration. This means your body has lost too much water. Dehydration is serious and needs to be treated right away. Signs of dehydration are:  ? Feeling more thirsty than usual.  ? Dry eyes and mouth. ? Feeling faint or lightheaded. ? Darker urine, and a smaller amount of urine than normal.  · To prevent dehydration, drink plenty of fluids, enough so that your urine is light yellow or clear like water. Choose water and other caffeine-free clear liquids until you feel better.  If you have kidney, heart, or liver disease and have to limit fluids, talk with your doctor before you increase the amount of fluids you drink. · Start to eat small amounts of mild foods the next day, if you feel like it. ? Avoid spicy foods, fruits, alcohol, and caffeine until 48 hours after all symptoms go away. ? Avoid chewing gum that has sorbitol. ? Try yogurt that has live cultures of Lactobacillus. You can check the label for this. Avoid other dairy products while you have diarrhea and for 3 days after symptoms go away. · Your doctor may recommend an over-the-counter medicine. These may include bismuth subsalicylate (Pepto-Bismol) or loperamide (Imodium). Read and follow all instructions on the label. Do not use these medicines if your doctor does not recommend them. · Be safe with medicines. If your doctor recommends prescription medicine, take it as prescribed. Call your doctor if you think you are having a problem with your medicine. You will get more details on the specific medicines your doctor prescribes. · If your doctor prescribes antibiotics, take them as directed. Do not stop taking them just because you feel better. You need to take the full course of antibiotics. When should you call for help? Call 911 anytime you think you may need emergency care. For example, call if:    · You passed out (lost consciousness).     · Your stools are maroon or very bloody.    Call your doctor now or seek immediate medical care if:    · You are dizzy or lightheaded, or you feel like you may faint.     · Your stools are black and look like tar, or they have streaks of blood.     · You have diarrhea and your belly pain or cramps are worse.     · You have signs of needing more fluids.  You have sunken eyes, a dry mouth, and pass only a little dark urine.    Watch closely for changes in your health, and be sure to contact your doctor if:    · You have 12 or more loose stools in 24 hours.     · You see pus in the diarrhea.     · You have a new or higher fever.     · Your diarrhea does not get better or is more frequent. Where can you learn more? Go to http://alyssa-amanda.info/. Enter L368 in the search box to learn more about \"Traveler's Diarrhea: Care Instructions. \"  Current as of: March 28, 2018  Content Version: 11.9  © 2266-0701 [employer], [employer]. Care instructions adapted under license by [employer] (which disclaims liability or warranty for this information). If you have questions about a medical condition or this instruction, always ask your healthcare professional. [address] any warranty or liability for your use of this information.

## 2019-05-28 NOTE — PROGRESS NOTES
Geovanna Artis is a 51 y.o. female who was seen in clinic today (5/28/2019). Assessment & Plan:       ICD-10-CM ICD-9-CM    1. Traveler's diarrhea A09 009.2 loperamide (IMODIUM) 2 mg capsule   2. Mild dehydration E86.0 276.51      Rapidly improving  antibiotics not indicated  Probiotics  Hydration  follow up prn              Subjective:   Geovanna Artis was seen today for Diarrhea      Diarrhea x 4 days: \"couldn't come out of the bathroom\" x 2 days, associated with cramping. Watery, yellow with mucus. Much improved x 2 days. Still watery, now brown. 3/day    associated with orthostatic lightheadedness  Tolerating po    [address] last week. Returned 2 days ago  No others ill          Outpatient Medications Marked as Taking for the 5/28/19 encounter (Office Visit) with Stone Evangelista MD   Medication Sig Dispense Refill    enalapril (VASOTEC) 20 mg tablet Take 2 Tabs by mouth daily. 180 Tab 1    chlorthalidone (HYGROTEN) 25 mg tablet Take 1 Tab by mouth daily. 90 Tab 1    amLODIPine (NORVASC) 10 mg tablet TAKE ONE TABLET BY MOUTH DAILY 90 Tab 4    atorvastatin (LIPITOR) 20 mg tablet TAKE ONE TABLET BY MOUTH DAILY 90 Tab 4    multivitamin (ONE A DAY) tablet Take 1 Tab by mouth daily.  ergocalciferol (VITAMIN D2) 50,000 unit capsule Take 1 Cap by mouth every seven (7) days. 20 Cap 4    albuterol (PROVENTIL HFA, VENTOLIN HFA, PROAIR HFA) 90 mcg/actuation inhaler 2 Puffs.  MIRENA 20 mcg/24 hr (5 years) IUD       CYANOCOBALAMIN, VITAMIN B-12, (VITAMIN B-12 PO) Take  by mouth. Patient Active Problem List    Diagnosis    Controlled type 2 diabetes mellitus with diabetic nephropathy, without long-term current use of insulin ([facility] [address]     3/29/16: dx'ed. Increased metformin (started for prediabetes) to 2000 mg daily.  Hyperlipidemia     9/4/2015: ASCVD 10 year risk 2.2 %., lifetime 39.1% 3/29/16: dx DM2. Statin initiated.         Vitamin D deficiency    Essential hypertension    Morbid obesity ([address]         Allergies   Allergen Reactions    Metformin Nausea and Vomiting         Patient Care Team:  Paula Malin MD as PCP - General  Ely Saeed RN as Nurse Navigator    The following sections were reviewed & updated as appropriate: PMH, PSH, FH, and SH. Review of Systems   Constitutional: Negative for fever. Gastrointestinal: Negative for abdominal pain and vomiting. Objective:     Visit Vitals  /86   Pulse 89   Temp 97.9 °F (36.6 °C)   Resp 12   Ht 5' 5\" (1.651 m)   Wt 276 lb (125.2 kg)   SpO2 97%   BMI 45.93 kg/m²      Physical Exam   Constitutional: She is oriented to person, place, and time. She appears well-developed. No distress. Pleasant uncomfortable appearing obese black female   HENT:   Head: Normocephalic and atraumatic. Right Ear: Hearing, tympanic membrane, external ear and ear canal normal.   Left Ear: Hearing, tympanic membrane, external ear and ear canal normal.   Nose: Nose normal.   Mouth/Throat: Uvula is midline and mucous membranes are normal. No oropharyngeal exudate, posterior oropharyngeal edema or posterior oropharyngeal erythema. Eyes: Conjunctivae are normal. No scleral icterus. Neck: Neck supple. Cardiovascular: Normal rate, regular rhythm and normal heart sounds. Pulmonary/Chest: Effort normal and breath sounds normal. No respiratory distress. She has no wheezes. She has no rhonchi. She has no rales. Abdominal: Soft. Bowel sounds are normal. She exhibits no distension. There is tenderness in the epigastric area. There is no rigidity, no rebound and no guarding. Lymphadenopathy:     She has no cervical adenopathy. Neurological: She is alert and oriented to person, place, and time. Skin: Skin is warm and dry. No rash noted. No cyanosis. Nails show no clubbing. Psychiatric: She has a normal mood and affect. Her behavior is normal. Judgment and thought content normal.         Disclaimer:     The patient understands our medical plan. Alternatives have been explained and offered. The risks, benefits and significant side effects of all medications have been reviewed. Anticipated time course and progression of condition reviewed. All questions have been addressed. She is encouraged to employ the information provided in the after visit summary, which was reviewed. Where applicable, she is instructed to call the clinic if she has not been notified either by phone or through [address] with the results of her tests or with an appointment plan for any referrals within 1 week(s). No news is not good news; it's no news. The patient  is to call if her condition worsens or fails to improve or if significant side effects are experienced. Aspects of this note may have been generated using voice recognition software. Despite editing, there may be unrecognized errors.        Joel Last MD

## 2019-07-17 DIAGNOSIS — E78.5 HYPERLIPIDEMIA, UNSPECIFIED HYPERLIPIDEMIA TYPE: ICD-10-CM

## 2019-07-17 DIAGNOSIS — I10 ESSENTIAL HYPERTENSION: ICD-10-CM

## 2019-07-18 RX ORDER — ATORVASTATIN CALCIUM 20 MG/1
TABLET, FILM COATED ORAL
Qty: 90 TAB | Refills: 0 | Status: SHIPPED | OUTPATIENT
Start: 2019-07-18 | End: 2019-09-12 | Stop reason: SDUPTHER

## 2019-07-18 RX ORDER — AMLODIPINE BESYLATE 10 MG/1
TABLET ORAL
Qty: 90 TAB | Refills: 0 | Status: SHIPPED | OUTPATIENT
Start: 2019-07-18 | End: 2019-09-12 | Stop reason: SDUPTHER

## 2019-07-18 NOTE — TELEPHONE ENCOUNTER
[place]. Donovan Waggoner
Patient is changing her pharmacy from [facility] to [facility]. Called [facility] verified patient last had her medication filled with them on 5/13/2019 for a 30 day supply and had 13 refills left on both Amlodipine and Atorvastatin. Patient is due for follow up with Dr. Nick Chappell on her blood pressure per refill request dated 2/3/2019 and labs. Patient has an appointment scheduled with Dr. Nick Chappell 8/12/19 with labs on 8/5/19. Please advise.
09-Sep-2018 15:50

## 2019-09-05 ENCOUNTER — LAB ONLY (OUTPATIENT)
Dept: FAMILY MEDICINE CLINIC | Age: 49
End: 2019-09-05

## 2019-09-05 ENCOUNTER — HOSPITAL ENCOUNTER (OUTPATIENT)
Dept: LAB | Age: 49
Discharge: HOME | End: 2019-09-05
Payer: COMMERCIAL

## 2019-09-05 DIAGNOSIS — I10 ESSENTIAL HYPERTENSION: Primary | ICD-10-CM

## 2019-09-05 DIAGNOSIS — I10 ESSENTIAL HYPERTENSION: ICD-10-CM

## 2019-09-05 DIAGNOSIS — E66.01 MORBID OBESITY (HCC): ICD-10-CM

## 2019-09-05 LAB
ALBUMIN SERPL-MCNC: 3.6 G/DL (ref 3.4–5)
ALBUMIN/GLOB SERPL: 1 {RATIO} (ref 0.8–1.7)
ALP SERPL-CCNC: 66 U/L (ref 45–117)
ALT SERPL-CCNC: 27 U/L (ref 13–56)
ANION GAP SERPL CALC-SCNC: 4 MMOL/L (ref 3–18)
AST SERPL-CCNC: 15 U/L (ref 10–38)
BILIRUB SERPL-MCNC: 0.4 MG/DL (ref 0.2–1)
BUN SERPL-MCNC: 12 MG/DL (ref 7–18)
BUN/CREAT SERPL: 12 (ref 12–20)
CALCIUM SERPL-MCNC: 9 MG/DL (ref 8.5–10.1)
CHLORIDE SERPL-SCNC: 100 MMOL/L (ref 100–111)
CO2 SERPL-SCNC: 31 MMOL/L (ref 21–32)
CREAT SERPL-MCNC: 0.99 MG/DL (ref 0.6–1.3)
GLOBULIN SER CALC-MCNC: 3.6 G/DL (ref 2–4)
GLUCOSE SERPL-MCNC: 116 MG/DL (ref 74–99)
POTASSIUM SERPL-SCNC: 3.9 MMOL/L (ref 3.5–5.5)
PROT SERPL-MCNC: 7.2 G/DL (ref 6.4–8.2)
SODIUM SERPL-SCNC: 135 MMOL/L (ref 136–145)

## 2019-09-05 PROCEDURE — 80053 COMPREHEN METABOLIC PANEL: CPT

## 2019-09-05 PROCEDURE — 84443 ASSAY THYROID STIM HORMONE: CPT

## 2019-09-06 LAB — TSH SERPL-ACNC: 1.88 UIU/ML

## 2019-09-12 ENCOUNTER — OFFICE VISIT (OUTPATIENT)
Dept: FAMILY MEDICINE CLINIC | Age: 49
End: 2019-09-12

## 2019-09-12 VITALS
TEMPERATURE: 98.1 F | HEART RATE: 80 BPM | HEIGHT: 65 IN | SYSTOLIC BLOOD PRESSURE: 126 MMHG | WEIGHT: 274 LBS | DIASTOLIC BLOOD PRESSURE: 84 MMHG | BODY MASS INDEX: 45.65 KG/M2 | OXYGEN SATURATION: 98 % | RESPIRATION RATE: 14 BRPM

## 2019-09-12 DIAGNOSIS — E66.01 MORBID OBESITY (HCC): ICD-10-CM

## 2019-09-12 DIAGNOSIS — E11.21 CONTROLLED TYPE 2 DIABETES MELLITUS WITH DIABETIC NEPHROPATHY, WITHOUT LONG-TERM CURRENT USE OF INSULIN (HCC): Primary | ICD-10-CM

## 2019-09-12 DIAGNOSIS — Z23 ENCOUNTER FOR IMMUNIZATION: ICD-10-CM

## 2019-09-12 DIAGNOSIS — E78.5 HYPERLIPIDEMIA, UNSPECIFIED HYPERLIPIDEMIA TYPE: ICD-10-CM

## 2019-09-12 DIAGNOSIS — I10 ESSENTIAL HYPERTENSION: ICD-10-CM

## 2019-09-12 RX ORDER — ENALAPRIL MALEATE 20 MG/1
40 TABLET ORAL DAILY
Qty: 180 TAB | Refills: 4 | Status: SHIPPED | OUTPATIENT
Start: 2019-09-12 | End: 2020-05-08 | Stop reason: SDUPTHER

## 2019-09-12 RX ORDER — AMLODIPINE BESYLATE 10 MG/1
TABLET ORAL
Qty: 90 TAB | Refills: 4 | Status: SHIPPED | OUTPATIENT
Start: 2019-09-12 | End: 2020-05-08 | Stop reason: SDUPTHER

## 2019-09-12 RX ORDER — CHLORTHALIDONE 25 MG/1
25 TABLET ORAL DAILY
Qty: 90 TAB | Refills: 2 | Status: SHIPPED | OUTPATIENT
Start: 2019-09-12 | End: 2020-05-08 | Stop reason: SDUPTHER

## 2019-09-12 RX ORDER — ATORVASTATIN CALCIUM 20 MG/1
20 TABLET, FILM COATED ORAL DAILY
Qty: 90 TAB | Refills: 4 | Status: SHIPPED | OUTPATIENT
Start: 2019-09-12 | End: 2020-05-08 | Stop reason: SDUPTHER

## 2019-09-12 NOTE — PROGRESS NOTES
Patient her for her follow up on HTN, and med refills. 1. Have you been to the ER, urgent care clinic since your last visit? Hospitalized since your last visit? No  2. Have you seen or consulted any other health care providers outside of the [address] [place] [address] since your last visit? Include any pap smears or colon screening. No    Medication reconciliation has been completed with patient. Care team discussed/updated as well as pharmacy. Health Maintenance Due   Topic Date Due    Pneumococcal 0-64 years (2 of 3 - PCV13) 03/29/2017    EYE EXAM RETINAL OR DILATED  04/26/2018    HEMOGLOBIN A1C Q6M  06/03/2019    Influenza Age 5 to Adult  08/01/2019     Health Maintenance reviewed - Will get flu vaccine today. Miriam Croby

## 2019-09-12 NOTE — PATIENT INSTRUCTIONS
Stock car with heart healthy snacks  A boiled egg in the morning  Whole grain oats (not instant)  Simple Oats  Schedule eye exam

## 2019-09-12 NOTE — PROGRESS NOTES
Roxie Camachonori 1970 came in to have blood drawn. Name/ verified. Venipuncture performed on right arm. Pt tolerated it well.      Misti Henderson, GASTONN

## 2019-09-12 NOTE — PROGRESS NOTES
Charlee Rollins is a 51 y.o. female who was seen in clinic today (9/12/2019). Assessment & Plan:       ICD-10-CM ICD-9-CM    1. Controlled type 2 diabetes mellitus with diabetic nephropathy, without long-term current use of insulin (HCC) E11.21 250.40      583.81    2. Essential hypertension I10 401.9 enalapril (VASOTEC) 20 mg tablet      amLODIPine (NORVASC) 10 mg tablet      chlorthalidone (HYGROTEN) 25 mg tablet   3. Hyperlipidemia, unspecified hyperlipidemia type E78.5 272.4 atorvastatin (LIPITOR) 20 mg tablet   4. Encounter for immunization Z23 V03.89 INFLUENZA VIRUS VAC QUAD,SPLIT,PRESV FREE SYRINGE IM   5. Morbid obesity ([address] E66.01 278.01        DM 2: Remains uncomplicated and well controlled. Schedule eye exam  Morbid obesity:   Patient Instructions   Stock car with heart healthy snacks  A boiled egg in the morning  Whole grain oats (not instant)  Simple Oats  Schedule eye exam      Hypertension: Well controlled, continue present management    Hyperlipidemia: Well controlled, continue present management           Follow-up and Dispositions    · Return in about 6 months (around 3/12/2020) for chronic medical conditions, non fasting labs 1 week prior. Subjective:   Charlee Rollins was seen today for Hypertension    Follow-up hypertension: on ARB, thiazide, CCB. Compliant.   Asymptomatic    Follow-up morbid obesity:    Follow-up diet-controlled diabetes  Follow-up hyperlipidemia      Admits to fast food on the run on hectic days  Abilio Angela has been more days than not in recent months      Lab Results   Component Value Date/Time    Cholesterol, total 159 12/03/2018 09:30 AM    HDL Cholesterol 55 12/03/2018 09:30 AM    LDL, calculated 92.8 12/03/2018 09:30 AM    VLDL, calculated 11.2 12/03/2018 09:30 AM    Triglyceride 56 12/03/2018 09:30 AM    CHOL/HDL Ratio 2.9 12/03/2018 09:30 AM       Lab Results   Component Value Date/Time    Hemoglobin A1c 6.1 (H) 12/03/2018 09:30 AM    Hemoglobin A1c 6.2 (H) 10/05/2017 09:26 AM    Hemoglobin A1c 6.5 (H) 04/17/2017 10:59 AM    Glucose 116 (H) 09/05/2019 09:15 AM    Microalbumin/Creat ratio (mg/g creat) 5 12/03/2018 09:30 AM    Microalbumin,urine random 0.53 12/03/2018 09:30 AM    LDL, calculated 92.8 12/03/2018 09:30 AM    Creatinine, POC 0.8 06/20/2013 09:39 AM    Creatinine 0.99 09/05/2019 09:15 AM       Results for orders placed or performed during the hospital encounter of 09/05/19   TSH W/ REFLX FREE T4 IF ABNORMAL   Result Value Ref Range    TSH 0.64 uIU/mL   METABOLIC PANEL, COMPREHENSIVE   Result Value Ref Range    Sodium 135 (L) 136 - 145 mmol/L    Potassium 3.9 3.5 - 5.5 mmol/L    Chloride 100 100 - 111 mmol/L    CO2 31 21 - 32 mmol/L    Anion gap 4 3.0 - 18 mmol/L    Glucose 116 (H) 74 - 99 mg/dL    BUN 12 7.0 - 18 MG/DL    Creatinine 0.99 0.6 - 1.3 MG/DL    BUN/Creatinine ratio 12 12 - 20      GFR est AA >60 >60 ml/min/1.73m2    GFR est non-AA 60 (L) >60 ml/min/1.73m2    Calcium 9.0 8.5 - 10.1 MG/DL    Bilirubin, total 0.4 0.2 - 1.0 MG/DL    ALT (SGPT) 27 13 - 56 U/L    AST (SGOT) 15 10 - 38 U/L    Alk. phosphatase 66 45 - 117 U/L    Protein, total 7.2 6.4 - 8.2 g/dL    Albumin 3.6 3.4 - 5.0 g/dL    Globulin 3.6 2.0 - 4.0 g/dL    A-G Ratio 1.0 0.8 - 1.7        Lab Results   Component Value Date/Time    TSH 1.88 09/05/2019 09:15 AM     Outpatient Medications Marked as Taking for the 9/12/19 encounter (Office Visit) with Casey Carrington MD   Medication Sig Dispense Refill    atorvastatin (LIPITOR) 20 mg tablet TAKE ONE TABLET BY MOUTH DAILY 90 Tab 0    amLODIPine (NORVASC) 10 mg tablet TAKE ONE TABLET BY MOUTH DAILY 90 Tab 0    loperamide (IMODIUM) 2 mg capsule Take 2 Caps by mouth once for 1 dose. Then take 1 cap after each loose bowel movement. Max 8 caps per 24 hours. Indications: traveler's diarrhea 20 Cap 0    enalapril (VASOTEC) 20 mg tablet Take 2 Tabs by mouth daily. 180 Tab 1    chlorthalidone (HYGROTEN) 25 mg tablet Take 1 Tab by mouth daily. 90 Tab 1    multivitamin (ONE A DAY) tablet Take 1 Tab by mouth daily.  MIRENA 20 mcg/24 hr (5 years) IUD       CYANOCOBALAMIN, VITAMIN B-12, (VITAMIN B-12 PO) Take  by mouth. Patient Active Problem List    Diagnosis    Controlled type 2 diabetes mellitus with diabetic nephropathy, without long-term current use of insulin ([facility] [address]     3/29/16: dx'ed. Increased metformin (started for prediabetes) to 2000 mg daily.  Hyperlipidemia     9/4/2015: ASCVD 10 year risk 2.2 %., lifetime 39.1% 3/29/16: dx DM2. Statin initiated.  Vitamin D deficiency    Essential hypertension    Morbid obesity ([facility] [address]         Allergies   Allergen Reactions    Metformin Nausea and Vomiting         Patient Care Team:  Annika Rodrigues MD as PCP - General  Ramon Chacon RN as Nurse Navigator    The following sections were reviewed & updated as appropriate: PMH, PSH, FH, and SH. Review of Systems   Constitutional: Negative for malaise/fatigue. Respiratory: Negative for shortness of breath. Cardiovascular: Negative for chest pain. Neurological: Negative for sensory change, speech change, focal weakness and headaches. Objective:     Visit Vitals  /84   Pulse 80   Temp 98.1 °F (36.7 °C) (Oral)   Resp 14   Ht 5' 5\" (1.651 m)   Wt 274 lb (124.3 kg)   SpO2 98%   BMI 45.60 kg/m²      Physical Exam   Constitutional: She is oriented to person, place, and time. She appears well-developed. No distress. Pleasant morbidly obese black female   HENT:   Head: Normocephalic and atraumatic. Neck:   16\"   Pulmonary/Chest: Effort normal.   Neurological: She is alert and oriented to person, place, and time. Psychiatric: She has a normal mood and affect. Her behavior is normal. Judgment and thought content normal.     Disclaimer: The patient understands our medical plan. Alternatives have been explained and offered.   The risks, benefits and significant side effects of all medications have been reviewed. Anticipated time course and progression of condition reviewed. All questions have been addressed. She is encouraged to employ the information provided in the after visit summary, which was reviewed. Where appropriate, she is instructed to call the clinic if she has not been notified either by phone or through [address] with the results of her tests or with an appointment plan for any referrals within 1 week(s). No news is not good news; it's no news. The patient  is to call if her condition worsens or fails to improve or if significant side effects are experienced. Aspects of this note may have been generated using voice recognition software. Despite editing, there may be unrecognized errors.        Radha Perkins MD

## 2019-12-30 LAB — TSH SERPL-ACNC: 1.88 UIU/ML (ref 0.36–3.74)

## 2020-03-04 ENCOUNTER — HOSPITAL ENCOUNTER (OUTPATIENT)
Dept: LAB | Age: 50
Discharge: HOME | End: 2020-03-04
Payer: COMMERCIAL

## 2020-03-04 ENCOUNTER — LAB ONLY (OUTPATIENT)
Dept: FAMILY MEDICINE CLINIC | Age: 50
End: 2020-03-04

## 2020-03-04 DIAGNOSIS — I10 ESSENTIAL HYPERTENSION: ICD-10-CM

## 2020-03-04 DIAGNOSIS — E55.9 VITAMIN D DEFICIENCY: ICD-10-CM

## 2020-03-04 DIAGNOSIS — E11.21 CONTROLLED TYPE 2 DIABETES MELLITUS WITH DIABETIC NEPHROPATHY, WITHOUT LONG-TERM CURRENT USE OF INSULIN (HCC): Primary | ICD-10-CM

## 2020-03-04 DIAGNOSIS — E78.5 HYPERLIPIDEMIA, UNSPECIFIED HYPERLIPIDEMIA TYPE: ICD-10-CM

## 2020-03-04 DIAGNOSIS — E11.21 CONTROLLED TYPE 2 DIABETES MELLITUS WITH DIABETIC NEPHROPATHY, WITHOUT LONG-TERM CURRENT USE OF INSULIN (HCC): ICD-10-CM

## 2020-03-04 LAB
ALBUMIN SERPL-MCNC: 3.8 G/DL (ref 3.4–5)
ALBUMIN/GLOB SERPL: 0.9 {RATIO} (ref 0.8–1.7)
ALP SERPL-CCNC: 78 U/L (ref 45–117)
ALT SERPL-CCNC: 23 U/L (ref 13–56)
ANION GAP SERPL CALC-SCNC: 7 MMOL/L (ref 3–18)
AST SERPL-CCNC: 10 U/L (ref 10–38)
BILIRUB SERPL-MCNC: 0.4 MG/DL (ref 0.2–1)
BUN SERPL-MCNC: 11 MG/DL (ref 7–18)
BUN/CREAT SERPL: 14 (ref 12–20)
CALCIUM SERPL-MCNC: 9.4 MG/DL (ref 8.5–10.1)
CHLORIDE SERPL-SCNC: 104 MMOL/L (ref 100–111)
CHOLEST SERPL-MCNC: 172 MG/DL
CO2 SERPL-SCNC: 30 MMOL/L (ref 21–32)
CREAT SERPL-MCNC: 0.79 MG/DL (ref 0.6–1.3)
CREAT UR-MCNC: 330 MG/DL (ref 30–125)
EST. AVERAGE GLUCOSE BLD GHB EST-MCNC: 151 MG/DL
FOLATE SERPL-MCNC: 10.3 NG/ML (ref 3.1–17.5)
GLOBULIN SER CALC-MCNC: 4.1 G/DL (ref 2–4)
GLUCOSE SERPL-MCNC: 121 MG/DL (ref 74–99)
HBA1C MFR BLD: 6.9 % (ref 4.2–5.6)
HDLC SERPL-MCNC: 54 MG/DL (ref 40–60)
HDLC SERPL: 3.2 {RATIO} (ref 0–5)
LDLC SERPL CALC-MCNC: 105.6 MG/DL (ref 0–100)
LIPID PROFILE,FLP: ABNORMAL
MICROALBUMIN UR-MCNC: 2.37 MG/DL (ref 0–3)
MICROALBUMIN/CREAT UR-RTO: 7 MG/G (ref 0–30)
POTASSIUM SERPL-SCNC: 4.1 MMOL/L (ref 3.5–5.5)
PROT SERPL-MCNC: 7.9 G/DL (ref 6.4–8.2)
SODIUM SERPL-SCNC: 141 MMOL/L (ref 136–145)
TRIGL SERPL-MCNC: 62 MG/DL (ref ?–150)
VIT B12 SERPL-MCNC: 1061 PG/ML (ref 211–911)
VLDLC SERPL CALC-MCNC: 12.4 MG/DL

## 2020-03-04 PROCEDURE — 83036 HEMOGLOBIN GLYCOSYLATED A1C: CPT

## 2020-03-04 PROCEDURE — 80053 COMPREHEN METABOLIC PANEL: CPT

## 2020-03-04 PROCEDURE — 82607 VITAMIN B-12: CPT

## 2020-03-04 PROCEDURE — 82043 UR ALBUMIN QUANTITATIVE: CPT

## 2020-03-04 PROCEDURE — 80061 LIPID PANEL: CPT

## 2020-03-04 NOTE — PROGRESS NOTES
Patient here for lab draw only name and  verified venipuncture attempted on patients left arm was not successful venipuncture performed on patients right arm was successful patient tolerated well.

## 2020-05-04 DIAGNOSIS — E78.5 HYPERLIPIDEMIA, UNSPECIFIED HYPERLIPIDEMIA TYPE: ICD-10-CM

## 2020-05-04 DIAGNOSIS — I10 ESSENTIAL HYPERTENSION: ICD-10-CM

## 2020-05-04 RX ORDER — CHLORTHALIDONE 25 MG/1
25 TABLET ORAL DAILY
Qty: 90 TAB | Status: CANCELLED | OUTPATIENT
Start: 2020-05-04

## 2020-05-04 RX ORDER — ENALAPRIL MALEATE 20 MG/1
40 TABLET ORAL DAILY
Qty: 180 TAB | Status: CANCELLED | OUTPATIENT
Start: 2020-05-04

## 2020-05-04 RX ORDER — ATORVASTATIN CALCIUM 20 MG/1
20 TABLET, FILM COATED ORAL DAILY
Qty: 90 TAB | Status: CANCELLED | OUTPATIENT
Start: 2020-05-04

## 2020-05-04 RX ORDER — AMLODIPINE BESYLATE 10 MG/1
TABLET ORAL
Qty: 90 TAB | Status: CANCELLED | OUTPATIENT
Start: 2020-05-04

## 2020-05-04 NOTE — TELEPHONE ENCOUNTER
Called patient after receiving a call from [facility] asking for new prescriptions. Patient overdue for her f/u on DM and has been scheduled on 5/8/2020 at 11:00 AM. Patient had labs done in March, orders have been pended please review and sign if appropriate. Patient is not out of medication be is asking if her medications can be sent to mail order pharmacy.

## 2020-05-08 ENCOUNTER — VIRTUAL VISIT (OUTPATIENT)
Dept: FAMILY MEDICINE CLINIC | Age: 50
End: 2020-05-08

## 2020-05-08 DIAGNOSIS — E66.01 MORBID OBESITY (HCC): ICD-10-CM

## 2020-05-08 DIAGNOSIS — E11.21 CONTROLLED TYPE 2 DIABETES MELLITUS WITH DIABETIC NEPHROPATHY, WITHOUT LONG-TERM CURRENT USE OF INSULIN (HCC): Primary | ICD-10-CM

## 2020-05-08 DIAGNOSIS — E78.5 HYPERLIPIDEMIA, UNSPECIFIED HYPERLIPIDEMIA TYPE: ICD-10-CM

## 2020-05-08 DIAGNOSIS — I10 ESSENTIAL HYPERTENSION: ICD-10-CM

## 2020-05-08 RX ORDER — ATORVASTATIN CALCIUM 20 MG/1
20 TABLET, FILM COATED ORAL DAILY
Qty: 90 TAB | Refills: 3 | Status: SHIPPED | OUTPATIENT
Start: 2020-08-08 | End: 2021-08-03 | Stop reason: SDUPTHER

## 2020-05-08 RX ORDER — AMLODIPINE BESYLATE 10 MG/1
TABLET ORAL
Qty: 90 TAB | Refills: 3 | Status: SHIPPED | OUTPATIENT
Start: 2020-08-08 | End: 2021-08-03 | Stop reason: SDUPTHER

## 2020-05-08 RX ORDER — CHLORTHALIDONE 25 MG/1
25 TABLET ORAL DAILY
Qty: 90 TAB | Refills: 3 | Status: SHIPPED | OUTPATIENT
Start: 2020-08-08 | End: 2021-08-03 | Stop reason: SDUPTHER

## 2020-05-08 RX ORDER — ENALAPRIL MALEATE 20 MG/1
40 TABLET ORAL DAILY
Qty: 180 TAB | Refills: 3 | Status: SHIPPED | OUTPATIENT
Start: 2020-08-08 | End: 2021-08-03 | Stop reason: SDUPTHER

## 2020-05-08 NOTE — PROGRESS NOTES
Jesus Perdomo is a 53 y.o. female who was seen by synchronous (real-time) audio-video technology on 5/8/2020. Consent: Jesus Perdomo, who was seen by synchronous (real-time) audio-video technology, and/or her healthcare decision maker, is aware that this patient-initiated, Telehealth encounter on 5/8/2020 is a billable service, with coverage as determined by her insurance carrier. She is aware that she may receive a bill and has provided verbal consent to proceed: Yes. Assessment & Plan:     Diagnoses and all orders for this visit:    1. Controlled type 2 diabetes mellitus with diabetic nephropathy, without long-term current use of insulin (HCC)  -     [facility]  -     HEMOGLOBIN A1C WITH EAG; Future  -     MAGNESIUM; Future  -     METABOLIC PANEL, COMPREHENSIVE; Future  -     MICROALBUMIN, UR, RAND W/ MICROALB/CREAT RATIO; Future  -     VITAMIN B12 & FOLATE; Future    2. Morbid obesity ([address]    3. Essential hypertension  -     chlorthalidone (HYGROTEN) 25 mg tablet; Take 1 Tab by mouth daily. -     enalapril (VASOTEC) 20 mg tablet; Take 2 Tabs by mouth daily. -     amLODIPine (NORVASC) 10 mg tablet; TAKE ONE TABLET BY MOUTH DAILY  -     MICROALBUMIN, UR, RAND W/ MICROALB/CREAT RATIO; Future    4. Hyperlipidemia, unspecified hyperlipidemia type  -     atorvastatin (LIPITOR) 20 mg tablet; Take 1 Tab by mouth daily.  -     LIPID PANEL W/ REFLX DIRECT LDL; Future    DM2/obesity: still within goal, but much higher, gained 10 lbs  Stop eating fast food  Prepare meals as a family  Eye exam when safe    Hypertension: will pull values from tracker and submit BPs through [employer] for review. Continue meds unchanged pending results    Lipids: Well controlled, continue present management    Follow-up and Dispositions    · Return in about 6 months (around 11/8/2020) for diabetes follow up, non fasting labs 1 week prior.                          Subjective:   Jesus Perdomo was seen today for No chief complaint on file. Follow-up hypertension: on ARB, thiazide, CCB. Compliant. Asymptomatic. Only checks sporadically. Most recent late April \"not high\" \"I was satisfied\"    Follow-up morbid obesity: gained 10 lbs since lockdown.  [facility]    Follow-up diet-controlled diabetes  Follow-up hyperlipidemia            Lab Results   Component Value Date/Time    Cholesterol, total 172 03/04/2020 09:16 AM    HDL Cholesterol 54 03/04/2020 09:16 AM    LDL, calculated 105.6 (H) 03/04/2020 09:16 AM    VLDL, calculated 12.4 03/04/2020 09:16 AM    Triglyceride 62 03/04/2020 09:16 AM    CHOL/HDL Ratio 3.2 03/04/2020 09:16 AM       Lab Results   Component Value Date/Time    Hemoglobin A1c 6.9 (H) 03/04/2020 09:16 AM    Hemoglobin A1c 6.1 (H) 12/03/2018 09:30 AM    Hemoglobin A1c 6.2 (H) 10/05/2017 09:26 AM    Glucose 121 (H) 03/04/2020 09:16 AM    Microalbumin/Creat ratio (mg/g creat) 7 03/04/2020 09:16 AM    Microalbumin,urine random 2.37 03/04/2020 09:16 AM    LDL, calculated 105.6 (H) 03/04/2020 09:16 AM    Creatinine, POC 0.8 06/20/2013 09:39 AM    Creatinine 0.79 03/04/2020 09:16 AM       Results for orders placed or performed during the hospital encounter of 03/04/20   HEMOGLOBIN A1C WITH EAG   Result Value Ref Range    Hemoglobin A1c 6.9 (H) 4.2 - 5.6 %    Est. average glucose 151 mg/dL   LIPID PANEL W/ REFLX DIRECT LDL   Result Value Ref Range    LIPID PROFILE          Cholesterol, total 172 <200 MG/DL    Triglyceride 62 <150 MG/DL    HDL Cholesterol 54 40 - 60 MG/DL    LDL, calculated 105.6 (H) 0 - 100 MG/DL    VLDL, calculated 12.4 MG/DL    CHOL/HDL Ratio 3.2 0 - 5.0     METABOLIC PANEL, COMPREHENSIVE   Result Value Ref Range    Sodium 141 136 - 145 mmol/L    Potassium 4.1 3.5 - 5.5 mmol/L    Chloride 104 100 - 111 mmol/L    CO2 30 21 - 32 mmol/L    Anion gap 7 3.0 - 18 mmol/L    Glucose 121 (H) 74 - 99 mg/dL    BUN 11 7.0 - 18 MG/DL    Creatinine 0.79 0.6 - 1.3 MG/DL    BUN/Creatinine ratio 14 12 - 20 GFR est AA >60 >60 ml/min/1.73m2    GFR est non-AA >60 >60 ml/min/1.73m2    Calcium 9.4 8.5 - 10.1 MG/DL    Bilirubin, total 0.4 0.2 - 1.0 MG/DL    ALT (SGPT) 23 13 - 56 U/L    AST (SGOT) 10 10 - 38 U/L    Alk. phosphatase 78 45 - 117 U/L    Protein, total 7.9 6.4 - 8.2 g/dL    Albumin 3.8 3.4 - 5.0 g/dL    Globulin 4.1 (H) 2.0 - 4.0 g/dL    A-G Ratio 0.9 0.8 - 1.7     MICROALBUMIN, UR, RAND W/ MICROALB/CREAT RATIO   Result Value Ref Range    Microalbumin,urine random 2.37 0 - 3.0 MG/DL    Creatinine, urine 330.00 (H) 30 - 125 mg/dL    Microalbumin/Creat ratio (mg/g creat) 7 0 - 30 mg/g   VITAMIN B12 & FOLATE   Result Value Ref Range    Vitamin B12 1,061 (H) 211 - 911 pg/mL    Folate 10.3 3.10 - 17.50 ng/mL      Lab Results   Component Value Date/Time    TSH 1.88 09/05/2019 09:16 AM       Prior to Admission medications    Medication Sig Start Date End Date Taking? Authorizing Provider   enalapril (VASOTEC) 20 mg tablet Take 2 Tabs by mouth daily. 9/12/19  Yes Galileo Mcclendon MD   atorvastatin (LIPITOR) 20 mg tablet Take 1 Tab by mouth daily. 9/12/19  Yes Galileo Mcclendon MD   amLODIPine (NORVASC) 10 mg tablet TAKE ONE TABLET BY MOUTH DAILY 9/12/19  Yes Galileo Mcclendon MD   chlorthalidone (HYGROTEN) 25 mg tablet Take 1 Tab by mouth daily. 9/12/19  Yes Galileo Mcclendon MD   multivitamin (ONE A DAY) tablet Take 1 Tab by mouth daily. Yes Provider, Historical   MIRENA 20 mcg/24 hr (5 years) IUD  3/29/17  Yes Provider, Historical   CYANOCOBALAMIN, VITAMIN B-12, (VITAMIN B-12 PO) Take  by mouth. Yes Provider, Historical   loperamide (IMODIUM) 2 mg capsule Take 2 Caps by mouth once for 1 dose. Then take 1 cap after each loose bowel movement. Max 8 caps per 24 hours. Indications: traveler's diarrhea 5/28/19 5/8/20  Galileo Mcclendon MD   ergocalciferol (VITAMIN D2) 50,000 unit capsule Take 1 Cap by mouth every seven (7) days.  10/19/17 5/8/20  Galileo Mcclendon MD   albuterol (PROVENTIL HFA, VENTOLIN HFA, PROAIR HFA) 90 mcg/actuation inhaler 2 Puffs. 5/8/20  Provider, Historical         Patient Active Problem List    Diagnosis    Controlled type 2 diabetes mellitus with diabetic nephropathy, without long-term current use of insulin ([facility]     3/29/16: dx'ed. Increased metformin (started for prediabetes) to 2000 mg daily.  Hyperlipidemia     9/4/2015: ASCVD 10 year risk 2.2 %., lifetime 39.1% 3/29/16: dx DM2. Statin initiated.  Vitamin D deficiency    Essential hypertension    Morbid obesity ([facility]         Allergies   Allergen Reactions    Metformin Nausea and Vomiting         Patient Care Team:  Julieth Madison MD as PCP - [facility], Geovanna Frye MD as PCP - [facility] Empaneled Provider  Terry Russell RN as Nurse Navigator        Review of Systems   Constitutional: Negative for malaise/fatigue. Respiratory: Negative for shortness of breath. Cardiovascular: Negative for chest pain. Neurological: Negative for sensory change, speech change, focal weakness and headaches. Objective: There were no vitals taken for this visit. General: alert, cooperative, no distress   Mental  status: normal mood, behavior, speech, dress, motor activity, and thought processes, able to follow commands   HENT: NCAT   Neck: no visualized mass   Resp: no respiratory distress   Neuro: no gross deficits   Skin: no discoloration or lesions of concern on visible areas   Psychiatric: normal affect, consistent with stated mood, no evidence of hallucinations     Additional exam findings:           Disclaimer: The patient understands our medical plan. Alternatives have been explained and offered. The risks, benefits and significant side effects of all medications have been reviewed. Anticipated time course and progression of condition reviewed. All questions have been addressed. She is encouraged to employ the information provided in the after visit summary, which was reviewed.       Arianna Galeas is a 53 y.o. female who was evaluated by an audio-video encounter for concerns as above. Patient identification was verified prior to start of the visit. A caregiver was present when appropriate. Due to this being a TeleHealth encounter (During ISY-27 public health emergency), evaluation of the following organ systems was limited: Vitals/Constitutional/EENT/Resp/CV/GI//MS/Neuro/Skin/Heme-Lymph-Imm. Pursuant to the emergency declaration under the [address] [place] [address], [facility] waiver authority and the [employer] Act, this Virtual Visit was conducted, with patient's (and/or legal guardian's) consent, to reduce the patient's risk of exposure to COVID-19 and provide necessary medical care. Services were provided through a synchronous discussion virtually to substitute for in-person clinic visit. I was at home. The patient was at home.     Dedra Nguyen MD

## 2021-07-06 DIAGNOSIS — E11.21 CONTROLLED TYPE 2 DIABETES MELLITUS WITH DIABETIC NEPHROPATHY, WITHOUT LONG-TERM CURRENT USE OF INSULIN (HCC): Primary | ICD-10-CM

## 2021-07-06 DIAGNOSIS — E55.9 VITAMIN D DEFICIENCY: ICD-10-CM

## 2021-07-06 DIAGNOSIS — I10 ESSENTIAL HYPERTENSION: ICD-10-CM

## 2021-07-06 DIAGNOSIS — E78.5 HYPERLIPIDEMIA, UNSPECIFIED HYPERLIPIDEMIA TYPE: ICD-10-CM

## 2021-07-26 ENCOUNTER — CLINICAL SUPPORT (OUTPATIENT)
Dept: FAMILY MEDICINE CLINIC | Age: 51
End: 2021-07-26
Payer: COMMERCIAL

## 2021-07-26 ENCOUNTER — HOSPITAL ENCOUNTER (OUTPATIENT)
Dept: LAB | Age: 51
Discharge: HOME | End: 2021-07-26
Payer: COMMERCIAL

## 2021-07-26 DIAGNOSIS — E55.9 VITAMIN D DEFICIENCY: Primary | ICD-10-CM

## 2021-07-26 DIAGNOSIS — I10 ESSENTIAL HYPERTENSION: ICD-10-CM

## 2021-07-26 DIAGNOSIS — E55.9 VITAMIN D DEFICIENCY: ICD-10-CM

## 2021-07-26 DIAGNOSIS — E11.21 CONTROLLED TYPE 2 DIABETES MELLITUS WITH DIABETIC NEPHROPATHY, WITHOUT LONG-TERM CURRENT USE OF INSULIN (HCC): ICD-10-CM

## 2021-07-26 DIAGNOSIS — E78.5 HYPERLIPIDEMIA, UNSPECIFIED HYPERLIPIDEMIA TYPE: ICD-10-CM

## 2021-07-26 LAB
25(OH)D3 SERPL-MCNC: 39.1 NG/ML (ref 30–100)
ALBUMIN SERPL-MCNC: 3.7 G/DL (ref 3.4–5)
ALBUMIN/GLOB SERPL: 1.1 {RATIO} (ref 0.8–1.7)
ALP SERPL-CCNC: 78 U/L (ref 45–117)
ALT SERPL-CCNC: 21 U/L (ref 13–56)
ANION GAP SERPL CALC-SCNC: 6 MMOL/L (ref 3–18)
AST SERPL-CCNC: 8 U/L (ref 10–38)
BILIRUB SERPL-MCNC: 0.4 MG/DL (ref 0.2–1)
BUN SERPL-MCNC: 13 MG/DL (ref 7–18)
BUN/CREAT SERPL: 16 (ref 12–20)
CALCIUM SERPL-MCNC: 9 MG/DL (ref 8.5–10.1)
CHLORIDE SERPL-SCNC: 104 MMOL/L (ref 100–111)
CHOLEST SERPL-MCNC: 154 MG/DL
CO2 SERPL-SCNC: 31 MMOL/L (ref 21–32)
CREAT SERPL-MCNC: 0.79 MG/DL (ref 0.6–1.3)
CREAT UR-MCNC: 161 MG/DL (ref 30–125)
EST. AVERAGE GLUCOSE BLD GHB EST-MCNC: 151 MG/DL
GLOBULIN SER CALC-MCNC: 3.4 G/DL (ref 2–4)
GLUCOSE SERPL-MCNC: 160 MG/DL (ref 74–99)
HBA1C MFR BLD: 6.9 % (ref 4.2–5.6)
HDLC SERPL-MCNC: 46 MG/DL (ref 40–60)
HDLC SERPL: 3.3 {RATIO} (ref 0–5)
LDLC SERPL CALC-MCNC: 94 MG/DL (ref 0–100)
LIPID PROFILE,FLP: NORMAL
MICROALBUMIN UR-MCNC: 0.76 MG/DL (ref 0–3)
MICROALBUMIN/CREAT UR-RTO: 5 MG/G (ref 0–30)
POTASSIUM SERPL-SCNC: 3.9 MMOL/L (ref 3.5–5.5)
PROT SERPL-MCNC: 7.1 G/DL (ref 6.4–8.2)
SODIUM SERPL-SCNC: 141 MMOL/L (ref 136–145)
TRIGL SERPL-MCNC: 70 MG/DL (ref ?–150)
VLDLC SERPL CALC-MCNC: 14 MG/DL

## 2021-07-26 PROCEDURE — 82043 UR ALBUMIN QUANTITATIVE: CPT

## 2021-07-26 PROCEDURE — 36415 COLL VENOUS BLD VENIPUNCTURE: CPT | Performed by: FAMILY MEDICINE

## 2021-07-26 PROCEDURE — 80061 LIPID PANEL: CPT

## 2021-07-26 PROCEDURE — 83036 HEMOGLOBIN GLYCOSYLATED A1C: CPT

## 2021-07-26 PROCEDURE — 82306 VITAMIN D 25 HYDROXY: CPT

## 2021-07-26 PROCEDURE — 80053 COMPREHEN METABOLIC PANEL: CPT

## 2021-07-26 PROCEDURE — 36415 COLL VENOUS BLD VENIPUNCTURE: CPT

## 2021-07-26 NOTE — PROGRESS NOTES
Patient here for [place] lab draw name and  verified venipuncture performed on patients right arm was successful patient tolerated well.

## 2021-08-03 ENCOUNTER — OFFICE VISIT (OUTPATIENT)
Dept: FAMILY MEDICINE CLINIC | Age: 51
End: 2021-08-03
Payer: COMMERCIAL

## 2021-08-03 VITALS
DIASTOLIC BLOOD PRESSURE: 80 MMHG | OXYGEN SATURATION: 99 % | BODY MASS INDEX: 44.43 KG/M2 | WEIGHT: 267 LBS | TEMPERATURE: 98.5 F | SYSTOLIC BLOOD PRESSURE: 126 MMHG | HEART RATE: 86 BPM | RESPIRATION RATE: 12 BRPM

## 2021-08-03 DIAGNOSIS — Z11.59 NEED FOR HEPATITIS C SCREENING TEST: ICD-10-CM

## 2021-08-03 DIAGNOSIS — E78.5 HYPERLIPIDEMIA, UNSPECIFIED HYPERLIPIDEMIA TYPE: ICD-10-CM

## 2021-08-03 DIAGNOSIS — E11.21 CONTROLLED TYPE 2 DIABETES MELLITUS WITH DIABETIC NEPHROPATHY, WITHOUT LONG-TERM CURRENT USE OF INSULIN (HCC): Primary | ICD-10-CM

## 2021-08-03 DIAGNOSIS — Z12.31 ENCOUNTER FOR SCREENING MAMMOGRAM FOR MALIGNANT NEOPLASM OF BREAST: ICD-10-CM

## 2021-08-03 DIAGNOSIS — Z12.11 SCREENING FOR COLON CANCER: ICD-10-CM

## 2021-08-03 DIAGNOSIS — I10 ESSENTIAL HYPERTENSION: ICD-10-CM

## 2021-08-03 DIAGNOSIS — E66.01 MORBID OBESITY (HCC): ICD-10-CM

## 2021-08-03 PROCEDURE — 99214 OFFICE O/P EST MOD 30 MIN: CPT | Performed by: FAMILY MEDICINE

## 2021-08-03 RX ORDER — MELATONIN
DAILY
COMMUNITY
End: 2022-03-03

## 2021-08-03 RX ORDER — AMLODIPINE BESYLATE 10 MG/1
TABLET ORAL
Qty: 90 TABLET | Refills: 4 | Status: SHIPPED | OUTPATIENT
Start: 2021-08-03 | End: 2021-08-19 | Stop reason: SDUPTHER

## 2021-08-03 RX ORDER — ENALAPRIL MALEATE 20 MG/1
40 TABLET ORAL DAILY
Qty: 180 TABLET | Refills: 4 | Status: SHIPPED | OUTPATIENT
Start: 2021-08-03 | End: 2022-02-12 | Stop reason: SDUPTHER

## 2021-08-03 RX ORDER — ATORVASTATIN CALCIUM 20 MG/1
20 TABLET, FILM COATED ORAL DAILY
Qty: 90 TABLET | Refills: 4 | Status: SHIPPED | OUTPATIENT
Start: 2021-08-03 | End: 2021-08-19 | Stop reason: SDUPTHER

## 2021-08-03 RX ORDER — CHLORTHALIDONE 25 MG/1
25 TABLET ORAL DAILY
Qty: 90 TABLET | Refills: 4 | Status: SHIPPED | OUTPATIENT
Start: 2021-08-03 | End: 2021-08-19 | Stop reason: SDUPTHER

## 2021-08-03 NOTE — PROGRESS NOTES
Mehdi Campos (: 1970) is a 49 y.o. female, established patient, here for:    ASSESSMENT/PLAN:  1. Controlled type 2 diabetes mellitus with diabetic nephropathy, without long-term current use of insulin ([facility]  Assessment & Plan:  Well controlled, continue present management with diet and exercise. Great job! Schedule eye exam.   Orders:  -     HEMOGLOBIN A1C WITH EAG; Future  -     LIPID PANEL W/ REFLX DIRECT LDL; Future  -     METABOLIC PANEL, COMPREHENSIVE; Future  2. Morbid obesity ([facility]  3. Essential hypertension  Assessment & Plan:  Well controlled, continue present management with enalapril, amlodipine and chlorthalidone   Orders:  -     enalapril (VASOTEC) 20 mg tablet; Take 2 Tablets by mouth daily. , Normal, Disp-180 Tablet, R-4  -     amLODIPine (NORVASC) 10 mg tablet; TAKE ONE TABLET BY MOUTH DAILY, Normal, Disp-90 Tablet, R-4  -     chlorthalidone (HYGROTON) 25 mg tablet; Take 1 Tablet by mouth daily. , Normal, Disp-90 Tablet, R-4  4. Hyperlipidemia, unspecified hyperlipidemia type  Assessment & Plan:  Well controlled, continue present management atorvastating   Orders:  -     atorvastatin (LIPITOR) 20 mg tablet; Take 1 Tablet by mouth daily. , Normal, Disp-90 Tablet, R-4  5. Screening for colon cancer  -     REFERRAL TO GASTROENTEROLOGY  6. Need for hepatitis C screening test  -     HEPATITIS C AB; Future  7. Encounter for screening mammogram for malignant neoplasm of breast  -     [facility] 3D JENNIFER W MAMMO BI SCREENING INCL CAD; Future      Return in about 6 months (around 2/3/2022) for chronic medical conditions, non fasting labs 1 week prior.       SUBJECTIVE/OBJECTIVE:  HPI    follow up diet controlled DM, hypertension, and hyperlipidemia  More vegetables  Walking daily weather permitting  No new concerns  Feeling well  No shortness of breath, chest pain, numbness or tingling in feet    Results for orders placed or performed during the hospital encounter of 21   HEMOGLOBIN A1C WITH EAG   Result Value Ref Range    Hemoglobin A1c 6.9 (H) 4.2 - 5.6 %    Est. average glucose 151 mg/dL   LIPID PANEL W/ REFLX DIRECT LDL   Result Value Ref Range    LIPID PROFILE          Cholesterol, total 154 <200 MG/DL    Triglyceride 70 <150 MG/DL    HDL Cholesterol 46 40 - 60 MG/DL    LDL, calculated 94 0 - 100 MG/DL    VLDL, calculated 14 MG/DL    CHOL/HDL Ratio 3.3 0 - 5.0     METABOLIC PANEL, COMPREHENSIVE   Result Value Ref Range    Sodium 141 136 - 145 mmol/L    Potassium 3.9 3.5 - 5.5 mmol/L    Chloride 104 100 - 111 mmol/L    CO2 31 21 - 32 mmol/L    Anion gap 6 3.0 - 18 mmol/L    Glucose 160 (H) 74 - 99 mg/dL    BUN 13 7.0 - 18 MG/DL    Creatinine 0.79 0.6 - 1.3 MG/DL    BUN/Creatinine ratio 16 12 - 20      GFR est AA >60 >60 ml/min/1.73m2    GFR est non-AA >60 >60 ml/min/1.73m2    Calcium 9.0 8.5 - 10.1 MG/DL    Bilirubin, total 0.4 0.2 - 1.0 MG/DL    ALT (SGPT) 21 13 - 56 U/L    AST (SGOT) 8 (L) 10 - 38 U/L    Alk. phosphatase 78 45 - 117 U/L    Protein, total 7.1 6.4 - 8.2 g/dL    Albumin 3.7 3.4 - 5.0 g/dL    Globulin 3.4 2.0 - 4.0 g/dL    A-G Ratio 1.1 0.8 - 1.7     MICROALBUMIN, UR, RAND W/ MICROALB/CREAT RATIO   Result Value Ref Range    Microalbumin,urine random 0.76 0 - 3.0 MG/DL    Creatinine, urine 161.00 (H) 30 - 125 mg/dL    Microalbumin/Creat ratio (mg/g creat) 5 0 - 30 mg/g   VITAMIN D, 25 HYDROXY   Result Value Ref Range    Vitamin D 25-Hydroxy 39.1 30 - 100 ng/mL         Physical Exam  Constitutional:       General: She is not in acute distress. Appearance: She is well-developed. She is not diaphoretic. HENT:      Head: Normocephalic and atraumatic. Cardiovascular:      Rate and Rhythm: Normal rate and regular rhythm. Heart sounds: Normal heart sounds. No murmur heard. No friction rub. No gallop. Pulmonary:      Effort: Pulmonary effort is normal. No respiratory distress. Breath sounds: Normal breath sounds. No wheezing, rhonchi or rales.    Skin:     General: Skin is warm and dry.      Nails: There is no clubbing. Neurological:      Mental Status: She is alert and oriented to person, place, and time. Psychiatric:         Behavior: Behavior normal.         Thought Content:  Thought content normal.         Judgment: Judgment normal.     Diabetic foot exam:     Left Foot:   Visual Exam: normal    Pulse DP: 2+ (normal)   Filament test: normal sensation          Right Foot:   Visual Exam: normal    Pulse DP: 2+ (normal)   Filament test: normal sensation            -- Gabriela MD Audrey

## 2021-08-03 NOTE — PROGRESS NOTES
Patient here for 6 month f/u on her chr conditions, she is asking for med refills today. 1. Have you been to the ER, urgent care clinic since your last visit? Hospitalized since your last visit? No  2. Have you seen or consulted any other health care providers outside of the [address] [place] [address] since your last visit? Include any pap smears or colon screening. No    Medication reconciliation has been completed with patient. Care team discussed/updated as well as pharmacy.     Health Maintenance Due   Topic Date Due    Hepatitis C Screening  Never done    Colorectal Cancer Screening Combo  Never done    Eye Exam Retinal or Dilated  04/26/2018    Foot Exam Q1  12/11/2019    Shingrix Vaccine Age 50> (1 of 2) Never done    Breast Cancer Screen Mammogram  02/26/2021

## 2021-08-06 ENCOUNTER — HOSPITAL ENCOUNTER (OUTPATIENT)
Dept: MAMMOGRAPHY | Age: 51
Discharge: HOME | End: 2021-08-06
Attending: FAMILY MEDICINE
Payer: COMMERCIAL

## 2021-08-06 PROCEDURE — 77063 BREAST TOMOSYNTHESIS BI: CPT

## 2021-08-10 DIAGNOSIS — Z12.31 ENCOUNTER FOR SCREENING MAMMOGRAM FOR MALIGNANT NEOPLASM OF BREAST: ICD-10-CM

## 2021-08-19 DIAGNOSIS — E78.5 HYPERLIPIDEMIA, UNSPECIFIED HYPERLIPIDEMIA TYPE: ICD-10-CM

## 2021-08-19 DIAGNOSIS — I10 ESSENTIAL HYPERTENSION: ICD-10-CM

## 2021-08-19 NOTE — TELEPHONE ENCOUNTER
Received fax from [email or web address] [address] requesting refills on patients medication. Patient was last seen in office for her chronic condition f/u on 8/3/21 with 6 month f/u. Med was last sent in 8/3/21 to [id number] with 4 refills.

## 2021-08-20 RX ORDER — ATORVASTATIN CALCIUM 20 MG/1
20 TABLET, FILM COATED ORAL DAILY
Qty: 90 TABLET | Refills: 4 | Status: SHIPPED | OUTPATIENT
Start: 2021-08-20 | End: 2022-05-10 | Stop reason: SDUPTHER

## 2021-08-20 RX ORDER — CHLORTHALIDONE 25 MG/1
25 TABLET ORAL DAILY
Qty: 90 TABLET | Refills: 4 | Status: SHIPPED | OUTPATIENT
Start: 2021-08-20 | End: 2022-05-10 | Stop reason: SDUPTHER

## 2021-08-20 RX ORDER — AMLODIPINE BESYLATE 10 MG/1
TABLET ORAL
Qty: 90 TABLET | Refills: 4 | Status: SHIPPED | OUTPATIENT
Start: 2021-08-20 | End: 2022-05-10 | Stop reason: SDUPTHER

## 2021-11-01 ENCOUNTER — OFFICE VISIT (OUTPATIENT)
Dept: FAMILY MEDICINE CLINIC | Age: 51
End: 2021-11-01
Payer: COMMERCIAL

## 2021-11-01 VITALS
HEIGHT: 65 IN | WEIGHT: 276.4 LBS | TEMPERATURE: 98.7 F | HEART RATE: 89 BPM | DIASTOLIC BLOOD PRESSURE: 78 MMHG | SYSTOLIC BLOOD PRESSURE: 124 MMHG | RESPIRATION RATE: 14 BRPM | OXYGEN SATURATION: 99 % | BODY MASS INDEX: 46.05 KG/M2

## 2021-11-01 DIAGNOSIS — M54.50 CHRONIC MIDLINE LOW BACK PAIN WITHOUT SCIATICA: Primary | ICD-10-CM

## 2021-11-01 DIAGNOSIS — G89.29 CHRONIC MIDLINE LOW BACK PAIN WITHOUT SCIATICA: Primary | ICD-10-CM

## 2021-11-01 DIAGNOSIS — Z98.890 HISTORY OF BACK SURGERY: ICD-10-CM

## 2021-11-01 DIAGNOSIS — M70.61 TROCHANTERIC BURSITIS OF RIGHT HIP: ICD-10-CM

## 2021-11-01 PROCEDURE — 99213 OFFICE O/P EST LOW 20 MIN: CPT | Performed by: FAMILY MEDICINE

## 2021-11-01 RX ORDER — MELATONIN 10 MG
CAPSULE ORAL
COMMUNITY

## 2021-11-01 NOTE — PROGRESS NOTES
Patient being seen in office today with c/o back and hip pain. She says she has had these symptoms x 2 months and her pain is getting worse. 1. \"Have you been to the ER, urgent care clinic since your last visit? Hospitalized since your last visit? \" No    2. \"Have you seen or consulted any other health care providers outside of the [address] [place] [address] since your last visit? \" No     3. For patients aged 39-70: Has the patient had a colonoscopy? No     If the patient is female:    4. For patients aged 41-77: Has the patient had a mammogram within the past 2 years? Yes,  satisfied with blue hyperlink    5. For patients aged 21-65: Has the patient had a pap smear?  No

## 2021-11-01 NOTE — PATIENT INSTRUCTIONS

## 2021-11-01 NOTE — ASSESSMENT & PLAN NOTE
With history of right L4-5 hemilaminectomy, cauda equina syndrome 2013. Plain films. PT referral. Low threshold advanced imaging and return to Dr. Vick Mora. For now, referring to Dr. Rafa Melendez in hopes of non surgical success.

## 2021-11-01 NOTE — PROGRESS NOTES
Shaye Salvador (: 1970) is a 49 y.o. female, established patient, here for:    ASSESSMENT/PLAN:  1. Chronic midline low back pain without sciatica  Assessment & Plan: With history of right L4-5 hemilaminectomy, cauda equina syndrome . Plain films. PT referral. Low threshold advanced imaging and return to Dr. Alejandra Olivera. For now, referring to Dr. Romy Bolton in hopes of non surgical success. Orders:  -     REFERRAL TO SPORTS MEDICINE  -     XR SPINE LUMB 2 OR 3 V; Future  -     REFERRAL TO PHYSICAL THERAPY  2. Trochanteric bursitis of right hip  -     REFERRAL TO SPORTS MEDICINE  3. History of back surgery  -     REFERRAL TO SPORTS MEDICINE  -     XR SPINE LUMB 2 OR 3 V; Future  -     REFERRAL TO PHYSICAL THERAPY      Return for well woman with pap. SUBJECTIVE/OBJECTIVE:  HPI    2 months low back pain, aggravated by prolonged standing. associated with numbness bilateral legs with prolonged standing. Weakness right leg - may be subjective due to pain. history of lumbar surgery: right L4-5 hemilaminectomy , cauda equina syndrome    2 months right hip pain, aggravate by prolonged sitting    Temporary relief with NSAIDs, acetaminophen    No loss of bowel or bladder control    Prior to Admission medications    Medication Sig Start Date End Date Taking? Authorizing Provider   melatonin 10 mg capsule Take  by mouth nightly. Yes Provider, Historical   chlorthalidone (HYGROTON) 25 mg tablet Take 1 Tablet by mouth daily. 21  Yes Bogdan Pope MD   atorvastatin (LIPITOR) 20 mg tablet Take 1 Tablet by mouth daily. 21  Yes Bogdan Pope MD   amLODIPine (NORVASC) 10 mg tablet TAKE ONE TABLET BY MOUTH DAILY 21  Yes Bogdan Pope MD   cholecalciferol (Vitamin D3) (1000 Units /25 mcg) tablet Take  by mouth daily. Yes Provider, Historical   enalapril (VASOTEC) 20 mg tablet Take 2 Tablets by mouth daily.  8/3/21  Yes Bogdan Pope MD   multivitamin (ONE A DAY) tablet Take 1 Tab by mouth daily. Yes Provider, Historical   MIRENA 20 mcg/24 hr (5 years) IUD  3/29/17  Yes Provider, Historical   CYANOCOBALAMIN, VITAMIN B-12, (VITAMIN B-12 PO) Take  by mouth. Yes Provider, Historical       Physical Exam  Constitutional:       General: She is not in acute distress. Appearance: She is well-developed. She is obese. HENT:      Head: Normocephalic and atraumatic. Pulmonary:      Effort: Pulmonary effort is normal.   Musculoskeletal:      Lumbar back: Tenderness (midline low back) present. Negative right straight leg raise test and negative left straight leg raise test.      Comments: + tenderness over right greater trochanter  Lesser tenderness over sciatic notch   Neurological:      Mental Status: She is alert and oriented to person, place, and time. Comments: Difficulty eliciting reflexes both sides  Normal gait  Heel and toe walk intact   Psychiatric:         Behavior: Behavior normal.         Thought Content:  Thought content normal.         Judgment: Judgment normal.               -- Tim Person MD

## 2021-11-02 DIAGNOSIS — M54.50 CHRONIC MIDLINE LOW BACK PAIN WITHOUT SCIATICA: ICD-10-CM

## 2021-11-02 DIAGNOSIS — G89.29 CHRONIC MIDLINE LOW BACK PAIN WITHOUT SCIATICA: ICD-10-CM

## 2021-11-02 DIAGNOSIS — Z98.890 HISTORY OF BACK SURGERY: ICD-10-CM

## 2021-11-10 ENCOUNTER — HOSPITAL ENCOUNTER (OUTPATIENT)
Dept: PHYSICAL THERAPY | Age: 51
Discharge: HOME | End: 2021-11-10
Attending: FAMILY MEDICINE
Payer: COMMERCIAL

## 2021-11-10 PROCEDURE — 97530 THERAPEUTIC ACTIVITIES: CPT

## 2021-11-10 PROCEDURE — 97161 PT EVAL LOW COMPLEX 20 MIN: CPT

## 2021-11-10 PROCEDURE — 97110 THERAPEUTIC EXERCISES: CPT

## 2021-11-10 NOTE — PROGRESS NOTES
[facility] at [address] [facility] [facility], [facility] [address] [facility] [address]  Phone: [phone]      Fax:  [phone]    Plan of Care/ Statement of Necessity for Physical Therapy Services  Patient name: Tiffany Ortega Start of Care: 11/10/2021   Referral source: Nabila Broussard MD : 1970    Medical Diagnosis: Other low back pain [M54.59]  Right hip pain [M25.551]  Payor: BLUE CROSS / Plan: [address] [place] [address] / Product Type: PPO /  Onset Date: On or about 8/10/21 with start of exercise     Treatment Diagnosis: LBP and Right Hip Pain   Prior Hospitalization: see medical history Provider#: [id number]   Medications: Verified on Patient summary List   Comorbidities: DM, HTN, Asthma  Prior Level of Function: No right hip pain and very limited back pain        The Plan of Care and following information is based on the information from the initial evaluation. Assessment/ key information: Patient is a 49 y.o. female referred to PT with the above Dx. Patient seen today for c/o LBP with standing or sitting for too long. Pain in the right hip and back. For the last 2 months pain has increased to the level of needing OTC medication to limit the discomfort. Pt with Hx of Lx surgery for fusion of L4-5 in .       Patient presents to PT with decreased balance, decreased strength, decreased flexibility, and decreased mobility of (B) LE with neural.  Patient s/s appear to be consistent w/ diagnosis. Patient demonstrates the potential to make functional gains within a reasonable time frame. Patient will benefit from skilled PT to address impairments and improve functional mobility, strength, gait and balance for an improved quality of life.   Fall Risk Assessment: Patient demonstrates no Fall Risk    Evaluation Complexity History HIGH Complexity :3+ comorbidities / personal factors will impact the outcome/ POC ; Examination MEDIUM Complexity : 3 Standardized tests and measures addressing body structure, function, activity limitation and / or participation in recreation  ;Presentation LOW Complexity : Stable, uncomplicated  ;Clinical Decision Making MEDIUM Complexity : FOTO score of 26-74  Overall Complexity Rating: LOW   Problem List: pain affecting function, decrease ROM, decrease strength, decrease ADL/ functional abilitiies, decrease activity tolerance, decrease flexibility/ joint mobility, decrease transfer abilities and other FOTO = 57   Treatment Plan may include any combination of the following: Therapeutic exercise, Therapeutic activities, Neuromuscular re-education, Physical agent/modality, Gait/balance training, Manual therapy, Patient education, Self Care training, Functional mobility training, Home safety training and Stair training  Patient / Family readiness to learn indicated by: asking questions, trying to perform skills and interest  Persons(s) to be included in education: patient (P)  Barriers to Learning/Limitations: None  Patient Goal (s): To lesson the pain with less medication. Don't like to take medicine  Patient Self Reported Health Status: fair  Rehabilitation Potential: good    Short Term Goals: To be accomplished in 5 treatments:  1. Pt will be compliant and independent with HEP in order to facilitate PT sessions and aid with self management   Eval Status:  Initiated   Current Status:  2. Pt to tolerate 30 min or more of TE and/or Interventions w/o increased s/s   Eval Status:  Initiated   Current Status:    Long Term Goals: To be accomplished in 10 treatments:  1. Pt will report 50% improvement or better with low back dysfunction to show a significant increase in ability to tolerate ADL   Eval Status:  Initiated   Current Status:  2. Pt will have decreased pain to 2/10 or better with activity to allow her to return to playing with her grand child in the park    Eval Status:  Pain 4/10 and can't play with grand child   Current Status:  3.   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 Pt will ambulate on TM for 1/2 mile or more with no difficulty or added hip pain for carryover to walking for exercise with no difficulty     Eval Status:  Stopped walking for exercise due to the pain   Current Status:  5. Pt will improve FOTO score to 68 in 11 visits to show significant improvement in function for progress to performing usual francoise   Eval Status: FOTO = 57   Current Status:     Frequency / Duration: Patient to be seen 2-3 times per week for 10 treatments. Patient/ Caregiver education and instruction: Diagnosis, prognosis, self care, activity modification and exercises   [x]  Plan of care has been reviewed with PTA  Comments:  Patient provided w/JOYCE Lama, PT 11/10/2021 10:30 AM  _____________________________________________________________________  I certify that the above Therapy Services are being furnished while the patient is under my care. I agree with the treatment plan and certify that this therapy is necessary.     [de-identified] Signature:____________Date:_________TIME:________     Gavin Dorantes MD  ** Signature, Date and Time must be completed for valid certification **    Please sign and return to [facility] at [address] [facility]camp[address]  [place], [facility] [address] [facility] [address]  Phone: [phone]      Fax:  [phone]

## 2021-11-10 NOTE — PROGRESS NOTES
PT DAILY TREATMENT NOTE/LUMBAR EVAL     Patient Name: Carlitos Mena  Date:11/10/2021  : 1970  [x]  Patient  Verified  Payor: BLUE CROSS / Plan: [address] [place] [address] / Product Type: PPO /    In time:1023  Out time:1105  Total Treatment Time (min): 42  Visit #: 1 of 10    Medicare/BCBS Only   Total Timed Codes (min):  24 1:1 Treatment Time:  42       Treatment Area: Other low back pain [M54.59]  Right hip pain [M25.551]      SUBJECTIVE  Pain Level (0-10 scale): 4  []constant [x]intermittent []improving []worsening []no change since onset     Any medication changes, allergies to medications, adverse drug reactions, diagnosis change, or new procedure performed?: [x] No    [] Yes (see summary sheet for update)  Subjective functional status/changes:       Subjective: Patient is a 49 y.o. female referred to PT with the above Dx. Patient seen today for c/o LBP with standing or sitting for too long. Pain in the right hip and back. For the last 2 months pain has increased to the level of needing OTC medication to limit the discomfort. Pt with Hx of Lx surgery for fusion of L4-5 in . Patient presents to PT with decreased balance, decreased strength, decreased flexibility, and decreased mobility of (B) LE with neural.  Patient s/s appear to be consistent w/ diagnosis. Patient demonstrates the potential to make functional gains within a reasonable time frame. Patient will benefit from skilled PT to address impairments and improve functional mobility, strength, gait and balance for an improved quality of life. Fall Risk Assessment: Patient demonstrates no Fall Risk      Mechanism of Injury: Unsure    Comorbidities: DM, HTN, Asthma  Prior Level of Function: No right hip pain and very limited back pain    FOTO: 57 / 68 in 11 visits    Goal: To lesson the pain with less medication. 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 Yes, with difficulty and a step to gait pattern    What type of daily activities/hobbies? Walking    Limitations to Activity/Recreation/PLOF: Slow walking up stairs at work, not able to play with grandchild at the park       Barriers: [x]pain []financial []time []transportation []other    Motivation: High    FABQ Score: []low []elevate    Cognition: A & O x 3    Other:    Risk For Falls:   [x]No  []low []elevate           OBJECTIVE/EXAMINATION  Demonstrated Balance: good                 SLS: Right 8  Left  30     Tandem Stance: NT          Romberg:  EO 30\"   EC 30\"  Demonstrated Mobility: (I)  Gait: WNL  - (B) Trunk Rot at 50%  - (+) (B) Slump Test  - (+) (B) Piriformis test  - Decr (B) Multifidus initiation  - Weakness (B) Hip and Lx region         AROM PROM Strength Pain? ?    Right Left Right Left Right Left    Hip Flx     4 4+    Hip Ext     4- 4-    Hib ABD     4 4+    Hip ADD          Hip IR          Hip ER     4-4+ 4-4+                    18 min [x]Eval                  []Re-Eval         16 min Therapeutic Exercise:  [x] See flow sheet : Develop and instruct HEP   Rationale: increase ROM, increase strength, and improve coordination to improve the patients ability to Initiate HEP and improve daily function      8 min Therapeutic Activity:  [x]  See flow sheet :   Rationale: increase ROM, increase strength and improve coordination  to improve the patients ability to tolerate usual activity                                                               With   [x] TE   [] TA   [] neuro   [] other: Patient Education: [x] Review HEP    [] Progressed/Changed HEP based on:   [] positioning   [] body mechanics   [] transfers   [] heat/ice application    [] other:       Other Objective/Functional Measures:     Access Code: [email or web address]  URL: [email or web address]  Date: 11/10/2021  Prepared by: Leta Pereira    Exercises  Seated Flexion Stretch - 1 x daily - 7 x weekly - 1 sets - 10 reps - 5 hold  Prone Hip Extension - 1 x daily - 7 x weekly - 3 sets - 10 reps  Clamshell - 1 x daily - 7 x weekly - 3 sets - 10 reps  Sidelying Hip Abduction - 1 x daily - 7 x weekly - 3 sets - 10 reps  Supine Bridge - 1 x daily - 7 x weekly - 3 sets - 10 reps  Seated Piriformis Stretch - 1 x daily - 7 x weekly - 3 sets - 3 reps - 30\" hold  Seated Hamstring Stretch with Chair - 1 x daily - 7 x weekly - 2 sets - 20 reps     Pain Level (0-10 scale) post treatment: 4     ASSESSMENT/Changes in Function:   - Pt demo good understanding of HEP      [x]  See Plan of Care  []  See progress note/recertification  []  See Discharge Summary         Progress towards goals / Updated goals:  Short Term Goals: To be accomplished in 5 treatments:  1. Pt will be compliant and independent with HEP in order to facilitate PT sessions and aid with self management   Eval Status:  Initiated   Current Status:  2. Pt to tolerate 30 min or more of TE and/or Interventions w/o increased s/s   Eval Status:  Initiated   Current Status:    Long Term Goals: To be accomplished in 10 treatments:  1. Pt will report 50% improvement or better with low back dysfunction to show a significant increase in ability to tolerate ADL   Eval Status:  Initiated   Current Status:  2. Pt will have decreased pain to 2/10 or better with activity to allow her to return to playing with her grand child in the park    Eval Status:  Pain 4/10 and can't play with grand child   Current Status:  3. 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   Pt will ambulate on TM for 1/2 mile or more with no difficulty or added hip pain for carryover to walking for exercise with no difficulty     Eval Status:  Stopped walking for exercise due to the pain   Current Status:  5.   Pt will improve FOTO score to 68 in 11 visits to show significant improvement in function for progress to performing usual francoise   Eval Status: FOTO = 57   Current Status:      PLAN  [x]  Upgrade activities as tolerated     [x]  Continue plan of care  []  Update interventions per flow sheet       []  Discharge due to:_  []  Other:_      Lucy Pretty, PT 11/10/2021  10:31 AM

## 2021-11-17 ENCOUNTER — HOSPITAL ENCOUNTER (OUTPATIENT)
Dept: PHYSICAL THERAPY | Age: 51
Discharge: HOME | End: 2021-11-17
Attending: FAMILY MEDICINE
Payer: COMMERCIAL

## 2021-11-17 PROCEDURE — 97140 MANUAL THERAPY 1/> REGIONS: CPT

## 2021-11-17 PROCEDURE — 97110 THERAPEUTIC EXERCISES: CPT

## 2021-11-17 PROCEDURE — 97112 NEUROMUSCULAR REEDUCATION: CPT

## 2021-11-17 NOTE — PROGRESS NOTES
PT DAILY TREATMENT NOTE     Patient Name: Shaye Sep  Date:2021  : 1970  [x]  Patient  Verified  Payor: [employer] / Plan: [address] [place] [address] / Product Type: PPO /    In time:  Out time:1:20  Total Treatment Time (min): 41  Visit #: 2 of 10    Medicare/BCBS Only   Total Timed Codes (min):  41 1:1 Treatment Time:  41       Treatment Area: Other low back pain [M54.59]  Right hip pain [M25.551]    SUBJECTIVE  Pain Level (0-10 scale): 5  Any medication changes, allergies to medications, adverse drug reactions, diagnosis change, or new procedure performed?: [x] No    [] Yes (see summary sheet for update)  Subjective functional status/changes:   [] No changes reported  Feeling better. The stretches seem to help  OBJECTIVE      19 min Therapeutic Exercise:  [x] See flow sheet :   Rationale: increase ROM, increase strength and improve coordination to improve the patients ability to tolerate increased activity    14 min Neuromuscular Re-education:  [x]  See flow sheet :   Rationale: increase ROM, increase strength and improve coordination  to improve the patients ability to perform increased activity    8 min Manual Therapy:  Inf glide right sacral base, (B) Innominate p/a mobs, (B) LE LAD, (B) L/S and T/S Rot mobs     The manual therapy interventions were performed at a separate and distinct time from the therapeutic activities interventions.   Rationale: decrease pain, increase ROM and increase tissue extensibility to improve activity tolerance          With   [x] TE   [] TA   [x] neuro   [] other: Patient Education: [x] Review HEP    [] Progressed/Changed HEP based on:   [] positioning   [] body mechanics   [] transfers   [] heat/ice application    [] other:      Other Objective/Functional Measures:   - Initiate treatment program per flow sheet  - Elev right crest  - Decr mobility (B) Innominate in stork stance    Pain Level (0-10 scale) post treatment: 3    ASSESSMENT/Changes in Function:   - Good tolerance with first full treatment  - Good exercise participation  - VCs with treatment program, pt demo same    Patient will continue to benefit from skilled PT services to modify and progress therapeutic interventions, address functional mobility deficits, address ROM deficits, address strength deficits, analyze and address soft tissue restrictions and analyze and cue movement patterns to attain remaining goals. []  See Plan of Care  []  See progress note/recertification  []  See Discharge Summary         Progress towards goals / Updated goals:  Short Term Goals: To be accomplished in 5 treatments:  1. Pt will be compliant and independent with HEP in order to facilitate PT sessions and aid with self management             Eval Status:  Initiated             Current Status: Pt reports compliance with HEP 11/17/21  2. Pt to tolerate 30 min or more of TE and/or Interventions w/o increased s/s             Eval Status:  Initiated             Current Status: Good tolerance with 40 min of treatment     Long Term Goals: To be accomplished in 10 treatments:  1. Pt will report 50% improvement or better with low back dysfunction to show a significant increase in ability to tolerate ADL             Eval Status:  Initiated             Current Status:  2. Pt will have decreased pain to 2/10 or better with activity to allow her to return to playing with her grand child in the park              Eval Status:  Pain 4/10 and can't play with grand child             Current Status: Progressing with no back pain today but hip pain is 5/10  3. 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   Pt will ambulate on TM for 1/2 mile or more with no difficulty or added hip pain for carryover to walking for exercise with no difficulty               Eval Status:  Stopped walking for exercise due to the pain             Current Status: Pt amb 400' with a good pace and no added discomfort 11/17/21  5.   Pt will improve FOTO score to 68 in 11 visits to show significant improvement in function for progress to performing usual francoise             Eval Status: FOTO = 57             Current Status:     PLAN  [x]  Upgrade activities as tolerated     [x]  Continue plan of care  []  Update interventions per flow sheet       []  Discharge due to:_  []  Other:_      Neelima Adarsh, PT 11/17/2021  12:40 PM    Future Appointments   Date Time Provider Gianluca Benjamin   11/24/2021 11:00 AM Carole Pereira [facility]   11/29/2021  8:45 AM Trevon Mccracken, PT [facility]   12/1/2021  9:30 AM James Birmingham, JUAN M [facility]   12/6/2021  8:45 AM Trevon Mccracken, PT [facility]   12/8/2021  9:30 AM James Birmingham PTA [facility]   12/13/2021  8:45 AM Mony Drew, PT [facility]   12/15/2021  8:45 AM Jodi Lazaro, PTA [facility]   1/26/2022  9:00 AM NURSE IONA TOPETE AMB   2/1/2022  9:30 AM MD IONA Tom AMB

## 2021-11-24 ENCOUNTER — APPOINTMENT (OUTPATIENT)
Dept: PHYSICAL THERAPY | Age: 51
End: 2021-11-24
Attending: FAMILY MEDICINE
Payer: COMMERCIAL

## 2021-12-01 ENCOUNTER — APPOINTMENT (OUTPATIENT)
Dept: PHYSICAL THERAPY | Age: 51
End: 2021-12-01
Attending: FAMILY MEDICINE
Payer: COMMERCIAL

## 2021-12-02 ENCOUNTER — HOSPITAL ENCOUNTER (OUTPATIENT)
Dept: PHYSICAL THERAPY | Age: 51
Discharge: HOME | End: 2021-12-02
Attending: FAMILY MEDICINE
Payer: COMMERCIAL

## 2021-12-02 PROCEDURE — 97530 THERAPEUTIC ACTIVITIES: CPT

## 2021-12-02 PROCEDURE — 97110 THERAPEUTIC EXERCISES: CPT

## 2021-12-02 NOTE — PROGRESS NOTES
[facility] at [address] [facility] [facility], [address] [place] [address]  Phone: [phone]      Fax:  [phone]    Progress Note  Patient name: Tiffany Ortega Start of Care: 11/10/2021   Referral source: Nabila Broussard MD : 1970               Medical Diagnosis: Other low back pain [M54.59]  Right hip pain [M25.551]  Payor: BLUE CROSS / Plan: [address] [place] [address] / Product Type: PPO /  Onset Date: On or about 8/10/21 with start of exercise               Treatment Diagnosis: LBP and Right Hip Pain   Prior Hospitalization: see medical history Provider#: [id number]   Medications: Verified on Patient summary List   Comorbidities: DM, HTN, Asthma  Prior Level of Function: No right hip pain and very limited back pain       Visits from Start of Care: 3    Missed Visits: 4      Key Functional Changes:  Patient has been unable to make the 2x per week treatment schedule due to holidays and her personal schedule, which has limited her overall progress towards decreased pain. Patient has been able to maintain her walking program at home and is demonstrating improve overall functional mobility. Patient will benefit from further skilled PT to address her remaining pain, strength and mobility goals.      Progress towards goals / Updated goals:  Short Term Goals: To be accomplished in 5 treatments:  1.  Pt will be compliant and independent with HEP in order to facilitate PT sessions and aid with self management             Eval Status:  Initiated             Current Status: met, Pt reports compliance with HEP 21  2.  Pt to tolerate 30 min or more of TE and/or Interventions w/o increased s/s             Eval Status:  Initiated             Current Status: Met, decreased pain with therex each visit so far (21)    [address] [facility] [address] be accomplished in 10 treatments:  1.  Pt will report 50% improvement or better with low back dysfunction to show a significant increase in ability to tolerate ADL             Eval Status:  Initiated             Current Status: progressing 45% reported (12/2/21)    2.  Pt will have decreased pain to 2/10 or better with activity to allow her to return to playing with her grand child in the park              Eval Status:  Pain 4/10 and can't play with grand child             Current Status: slight progress, increased activity tolerance but continues to have 5-6/10 pain in right hip on \"bad\" days (12/2/21)    3.  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  Pt will ambulate on TM for 1/2 mile or more with no difficulty or added hip pain for carryover to walking for exercise with no difficulty               Eval Status:  Stopped walking for exercise due to the pain             Current Status: Pt amb approx 1.25 miles 4 times a week with moderate increase in discomfort post (12/2/21)  5.  Pt will improve FOTO score to 68 in 11 visits to show significant improvement in function for progress to performing usual francoise             Eval Status: FOTO = 57             Current Status: not met, very slight decrease FOTO 56 (12/2/21)      Updated Goals: to be achieved in 10 treatments:  1.  Pt will report 50% improvement or better with low back dysfunction to show a significant increase in ability to tolerate ADL             PN: progressing 45% reported (12/2/21)    2. . Pt will have decreased pain to 2/10 or better with activity to allow her to return to playing with her grand child in the park              PN: slight progress, increased activity tolerance but continues to have 5-6/10 pain in right hip on \"bad\" days (12/2/21)    3.   Pt will ambulate on TM for 1/2 mile or more with no difficulty or added hip pain for carryover to walking for exercise with no difficulty   PN:  Pt amb approx 1.25 miles 4 times a week with moderate increase in discomfort post (12/2/21)    4. Pt will improve FOTO score to 68 in 11 visits to show significant improvement in function for progress to performing usual francoise            PN;  not met, very slight decrease FOTO 56 (12/2/21)    ASSESSMENT/RECOMMENDATIONS:  [x]Continue therapy per initial plan/protocol at a frequency of  2 x per week for 10 visits  []Continue therapy with the following recommended changes:_____________________      _____________________________________________________________________  []Discontinue therapy progressing towards or have reached established goals  []Discontinue therapy due to lack of appreciable progress towards goals  []Discontinue therapy due to lack of attendance or compliance  []Await Physician's recommendations/decisions regarding therapy  []Other:________________________________________________________________    Thank you for this referral.   Alicia Bety, PTA 12/2/2021 11:08 AM  [facility]  NOTE TO PHYSICIAN:  [address]   FAX TO [facility]: [phone]  If you are unable to process this request in 24 hours please contact our office:   [phone]  []  I have read the above report and request that my patient continue as recommended. []  I have read the above report and request that my patient continue therapy with the following changes/special instructions:________________________________________  []I have read the above report and request that my patient be discharged from therapy.     Physician's Signature:____________Date:_________TIME:________     Gavin Dorantes MD  ** Signature, Date and Time must be completed for valid certification **

## 2021-12-02 NOTE — PROGRESS NOTES
PT DAILY TREATMENT NOTE     Patient Name: Jody Luciano  Date:2021  : 1970  [x]  Patient  Verified  Payor: LINDA [place]urn / Plan: [address] [place] [address] / Product Type: PPO /    In time:1015  Out time:1055  Total Treatment Time (min): 40  Visit #: 3 of 10    Medicare/BCBS Only   Total Timed Codes (min):  40 1:1 Treatment Time:  40       Treatment Area: Other low back pain [M54.59]  Right hip pain [M25.551]    SUBJECTIVE  Pain Level (0-10 scale): 6  Any medication changes, allergies to medications, adverse drug reactions, diagnosis change, or new procedure performed?: [x] No    [] Yes (see summary sheet for update)  Subjective functional status/changes:   [] No changes reported  Patient reported continued pain in right hip upon arrival     OBJECTIVE    30 min Therapeutic Exercise:  [] See flow sheet :   Rationale: increase ROM and increase strength to improve the patients ability to perform ADLs    10 min Therapeutic Activity:  [x]  See flow sheet :   Rationale: increase strength and improve coordination  to improve the patients ability to perform ADLs             With   [] TE   [] TA   [] neuro   [] other: Patient Education: [x] Review HEP    [] Progressed/Changed HEP based on:   [] positioning   [] body mechanics   [] transfers   [] heat/ice application    [] other:      Other Objective/Functional Measures:   See goals below     Pain Level (0-10 scale) post treatment: 3    ASSESSMENT/Changes in Function: Patient has been unable to make the 2x per week treatment schedule due to holidays and her personal schedule, which has limited her overall progress towards decreased pain. Patient has been able to maintain her walking program at home and is demonstrating improve overall functional mobility. Patient will benefit from further skilled PT to address her remaining pain, strength and mobility goals.      Patient will continue to benefit from skilled PT services to modify and progress therapeutic interventions, address functional mobility deficits, address ROM deficits, address strength deficits, analyze and address soft tissue restrictions and analyze and cue movement patterns to attain remaining goals.      [x]  See Plan of Care  [x]  See progress note/recertification  []  See Discharge Summary         Progress towards goals / Updated goals:  Short Term Goals: To be accomplished in 5 treatments:  1.  Pt will be compliant and independent with HEP in order to facilitate PT sessions and aid with self management             Eval Status:  Initiated             Current Status: met, Pt reports compliance with HEP 12/2/21  2.  Pt to tolerate 30 min or more of TE and/or Interventions w/o increased s/s             Eval Status:  Initiated             Current Status: Met, decreased pain with therex each visit so far (12/2/21)    [address] be accomplished in 10 treatments:  1.  Pt will report 50% improvement or better with low back dysfunction to show a significant increase in ability to tolerate ADL             Eval Status:  Initiated             Current Status: progressing 45% reported (12/2/21)    2.  Pt will have decreased pain to 2/10 or better with activity to allow her to return to playing with her grand child in the park              Eval Status:  Pain 4/10 and can't play with grand child             Current Status: slight progress, increased activity tolerance but continues to have 5-6/10 pain in right hip on \"bad\" days (12/2/21)    3.  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  Pt will ambulate on TM for 1/2 mile or more with no difficulty or added hip pain for carryover to walking for exercise with no difficulty               Eval Status:  Stopped walking for exercise due to the pain             Current Status: Pt amb approx 1.25 miles 4 times a week with moderate increase in discomfort post (12/2/21)  5.  Pt will improve FOTO score to 68 in 11 visits to show significant improvement in function for progress to performing usual francoise             Eval Status: FOTO = 57             Current Status: not met, very slight decrease FOTO 56 (12/2/21)    PLAN  []  Upgrade activities as tolerated     [x]  Continue plan of care  []  Update interventions per flow sheet       []  Discharge due to:_  [x]  Other:_  PN due  Ronak Hutchins, PTA 12/2/2021  10:40 AM    Future Appointments   Date Time Provider Gianluca Benjamin   12/6/2021  8:45 AM Kip Ibarra, PT [facility]   12/8/2021  9:30 AM Hafsa Nice [facility]   12/13/2021  8:45 AM Patrizia Allen PT [facility]   12/15/2021  8:45 AM [facility]   1/26/2022  9:00 AM NURSE IONA TOPETE AMB   2/1/2022  9:30 AM MD IONA Zamora AMB

## 2021-12-06 ENCOUNTER — APPOINTMENT (OUTPATIENT)
Dept: PHYSICAL THERAPY | Age: 51
End: 2021-12-06
Attending: FAMILY MEDICINE
Payer: COMMERCIAL

## 2021-12-08 ENCOUNTER — HOSPITAL ENCOUNTER (OUTPATIENT)
Dept: PHYSICAL THERAPY | Age: 51
Discharge: HOME | End: 2021-12-08
Attending: FAMILY MEDICINE
Payer: COMMERCIAL

## 2021-12-08 PROCEDURE — 97112 NEUROMUSCULAR REEDUCATION: CPT

## 2021-12-08 PROCEDURE — 97110 THERAPEUTIC EXERCISES: CPT

## 2021-12-08 NOTE — PROGRESS NOTES
PT DAILY TREATMENT NOTE     Patient Name: Shira Jacobs  Date:2021  : 1970  [x]  Patient  Verified  Payor: BLUE CROSS / Plan: [address] [place] [address] / Product Type: PPO /    In time:932  Out time:1022  Total Treatment Time (min): 50  Visit #: 2 of 10    Medicare/BCBS Only   Total Timed Codes (min):  50 1:1 Treatment Time:  50       Treatment Area: Other low back pain [M54.59]  Right hip pain [M25.551]    SUBJECTIVE  Pain Level (0-10 scale): 3  Any medication changes, allergies to medications, adverse drug reactions, diagnosis change, or new procedure performed?: [x] No    [] Yes (see summary sheet for update)  Subjective functional status/changes:   [] No changes reported  Patient reported decreased pain in right side of back and hip    OBJECTIVE      35 min Therapeutic Exercise:  [x] See flow sheet :   Rationale: increase ROM and increase strength to improve the patients ability to perform ADLs     15 min Neuromuscular Re-education:  [x]  See flow sheet :   Rationale: increase strength, improve coordination and increase proprioception  to improve the patients ability to perform ADLs         With   [] TE   [] TA   [] neuro   [] other: Patient Education: [x] Review HEP    [] Progressed/Changed HEP based on:   [] positioning   [] body mechanics   [] transfers   [] heat/ice application    [] other:      Other Objective/Functional Measures:   - progressed per flow sheet adding:   - mini squats to target 2 x 10   - sit<>stands 2 x 10    - hip flexor stretch supine 1 min (B)    Pain Level (0-10 scale) post treatment: 4    ASSESSMENT/Changes in Function: Progressed therex for body mechanics training and increased core strength with good tolerance during treatment, but a slight increase in right sided low back and hip pain post treatment. Reassess tolerance of progressions NV.     Patient will continue to benefit from skilled PT services to modify and progress therapeutic interventions, address functional mobility deficits, address ROM deficits, address strength deficits, analyze and address soft tissue restrictions and analyze and cue movement patterns to attain remaining goals. [x]  See Plan of Care  []  See progress note/recertification  []  See Discharge Summary         Progress towards goals / Updated goals:  Updated Goals: to be achieved in 10 treatments:  1.  Pt will report 50% improvement or better with low back dysfunction to show a significant increase in ability to tolerate ADL             PN: progressing 45% reported (12/2/21)     2. . Pt will have decreased pain to 2/10 or better with activity to allow her to return to playing with her grand child in the park   PN: slight progress, increased activity tolerance but continues to have 5-6/10 pain in right hip on \"bad\" days (12/2/21)   Current: Progressing, 3/10 pain today (12/8/21)     3.  Pt will ambulate on TM for 1/2 mile or more with no difficulty or added hip pain for carryover to walking for exercise with no difficulty           PN:  Pt amb approx 1.25 miles 4 times a week with moderate increase in discomfort post (12/2/21)     4. Pt will improve FOTO score to 68 in 11 visits to show significant improvement in function for progress to performing usual daily activities.    PN;  not met, very slight decrease FOTO 56 (12/2/21)    PLAN  [x]  Upgrade activities as tolerated     [x]  Continue plan of care  []  Update interventions per flow sheet       []  Discharge due to:_  []  Other:_      Jeffry Abreu, PTA 12/8/2021  9:36 AM    Future Appointments   Date Time Provider Gianluca Benjamin   12/13/2021  8:45 AM Alvin Ramesh, PT [facility]   12/15/2021  8:45 AM Ben Spears PTA [facility]   1/26/2022  9:00 AM NURSE IONA MONSON [facility] AMB   2/1/2022  9:30 AM Sharrie Koyanagi, MD [facility] AMB

## 2021-12-13 ENCOUNTER — HOSPITAL ENCOUNTER (OUTPATIENT)
Dept: PHYSICAL THERAPY | Age: 51
Discharge: HOME | End: 2021-12-13
Attending: FAMILY MEDICINE
Payer: COMMERCIAL

## 2021-12-13 PROCEDURE — 97112 NEUROMUSCULAR REEDUCATION: CPT

## 2021-12-13 PROCEDURE — 97110 THERAPEUTIC EXERCISES: CPT

## 2021-12-13 NOTE — PROGRESS NOTES
PT DAILY TREATMENT NOTE     Patient Name: Aurea Oh  Date:2021  : 1970  [x]  Patient  Verified  Payor: BLUE CROSS / Plan: [address] [place] [address] / Product Type: PPO /    In time:08:51  Out time:09:31  Total Treatment Time (min): 40  Visit #: 3 of 10     Medicare/BCBS Only   Total Timed Codes (min):  40 1:1 Treatment Time:  40       Treatment Area: Other low back pain [M54.59]  Right hip pain [M25.551]    SUBJECTIVE  Pain Level (0-10 scale): 3  Any medication changes, allergies to medications, adverse drug reactions, diagnosis change, or new procedure performed?: [x] No    [] Yes (see summary sheet for update)  Subjective functional status/changes:   [x] No changes reported      OBJECTIVE      30 min Therapeutic Exercise:  [x] See flow sheet :   Rationale: increase ROM and increase strength to improve the patients ability to perform ADLs     10 min Neuromuscular Re-education:  [x]  See flow sheet :   Rationale: increase strength, improve coordination and increase proprioception  to improve the patients ability to perform ADLs         With   [] TE   [] TA   [] neuro   [] other: Patient Education: [x] Review HEP    [] Progressed/Changed HEP based on:   [] positioning   [] body mechanics   [] transfers   [] heat/ice application    [] other:      Other Objective/Functional Measures:   Subjective % improvement: 50%; improvement of pain in hips, improvement in ascending and descending steps     Added:   -paloff press outs (with orange therapy band)  -swiss ball x 3 in hook-lying (scottie, roll up, over head)  Progressed: resistance for clam shells     Pain Level (0-10 scale) post treatment: 2    ASSESSMENT/Changes in Function: Patient with positive response to today's treatment session as patient reports decreased pain levels post PT session. Skilled verbal cuing provided to perform added therex with proper form.  Overall, the patient is making progress towards therapy goals as she reports ~50% improvement in function since [facility] (see below). Patient will continue to benefit from skilled PT services to modify and progress therapeutic interventions, address functional mobility deficits, address ROM deficits, address strength deficits, analyze and address soft tissue restrictions and analyze and cue movement patterns to attain remaining goals. [x]  See Plan of Care  []  See progress note/recertification  []  See Discharge Summary         Progress towards goals / Updated goals:  Updated Goals: to be achieved in 10 treatments:  1.  Pt will report 50% improvement or better with low back dysfunction to show a significant increase in ability to tolerate ADL             PN: progressing 45% reported (12/2/21)   Current: Subjective % improvement: 50% ; improvement of pain in hips, improvement in ascending and descending steps (12/13/21) met  2. . Pt will have decreased pain to 2/10 or better with activity to allow her to return to playing with her grand child in the park   PN: slight progress, increased activity tolerance but continues to have 5-6/10 pain in right hip on \"bad\" days (12/2/21)   Current: Progressing, 3/10 pain today (12/8/21)     3.  Pt will ambulate on TM for 1/2 mile or more with no difficulty or added hip pain for carryover to walking for exercise with no difficulty             PN:  Pt amb approx 1.25 miles 4 times a week with moderate increase in discomfort post (12/2/21)     4. Pt will improve FOTO score to 68 in 11 visits to show significant improvement in function for progress to performing usual daily activities.    PN;  not met, very slight decrease FOTO 56 (12/2/21)    PLAN  [x]  Upgrade activities as tolerated     [x]  Continue plan of care  []  Update interventions per flow sheet       []  Discharge due to:_  []  Other:_      Zackary Richey, PT 12/13/2021  1:44 PM     Future Appointments   Date Time Provider Gianluca Benjamin   12/15/2021  8:45 AM Fela Arreola, PTA [facility]   1/26/2022  9:00 AM NURSE IONA MONSON BS AMB   2/1/2022  9:30 AM Danielle Weiss MD [facility] AMB

## 2021-12-15 ENCOUNTER — APPOINTMENT (OUTPATIENT)
Dept: PHYSICAL THERAPY | Age: 51
End: 2021-12-15
Attending: FAMILY MEDICINE
Payer: COMMERCIAL

## 2022-01-26 ENCOUNTER — HOSPITAL ENCOUNTER (OUTPATIENT)
Dept: LAB | Age: 52
Discharge: HOME | End: 2022-01-26
Payer: COMMERCIAL

## 2022-01-26 ENCOUNTER — CLINICAL SUPPORT (OUTPATIENT)
Dept: FAMILY MEDICINE CLINIC | Age: 52
End: 2022-01-26
Payer: COMMERCIAL

## 2022-01-26 DIAGNOSIS — E11.21 CONTROLLED TYPE 2 DIABETES MELLITUS WITH DIABETIC NEPHROPATHY, WITHOUT LONG-TERM CURRENT USE OF INSULIN (HCC): ICD-10-CM

## 2022-01-26 DIAGNOSIS — I10 ESSENTIAL HYPERTENSION: Primary | ICD-10-CM

## 2022-01-26 DIAGNOSIS — Z11.59 NEED FOR HEPATITIS C SCREENING TEST: ICD-10-CM

## 2022-01-26 LAB
ALBUMIN SERPL-MCNC: 3.9 G/DL (ref 3.4–5)
ALBUMIN/GLOB SERPL: 1.1 {RATIO} (ref 0.8–1.7)
ALP SERPL-CCNC: 77 U/L (ref 45–117)
ALT SERPL-CCNC: 28 U/L (ref 13–56)
ANION GAP SERPL CALC-SCNC: 11 MMOL/L (ref 3–18)
AST SERPL-CCNC: 15 U/L (ref 10–38)
BILIRUB SERPL-MCNC: 0.9 MG/DL (ref 0.2–1)
BUN SERPL-MCNC: 9 MG/DL (ref 7–18)
BUN/CREAT SERPL: 11 (ref 12–20)
CALCIUM SERPL-MCNC: 9.4 MG/DL (ref 8.5–10.1)
CHLORIDE SERPL-SCNC: 97 MMOL/L (ref 100–111)
CHOLEST SERPL-MCNC: 165 MG/DL
CO2 SERPL-SCNC: 27 MMOL/L (ref 21–32)
CREAT SERPL-MCNC: 0.82 MG/DL (ref 0.6–1.3)
EST. AVERAGE GLUCOSE BLD GHB EST-MCNC: 174 MG/DL
GLOBULIN SER CALC-MCNC: 3.7 G/DL (ref 2–4)
GLUCOSE SERPL-MCNC: 203 MG/DL (ref 74–99)
HBA1C MFR BLD: 7.7 % (ref 4.2–5.6)
HDLC SERPL-MCNC: 47 MG/DL (ref 40–60)
HDLC SERPL: 3.5 {RATIO} (ref 0–5)
LDLC SERPL CALC-MCNC: 96.8 MG/DL (ref 0–100)
LIPID PROFILE,FLP: NORMAL
POTASSIUM SERPL-SCNC: 3.8 MMOL/L (ref 3.5–5.5)
PROT SERPL-MCNC: 7.6 G/DL (ref 6.4–8.2)
SODIUM SERPL-SCNC: 135 MMOL/L (ref 136–145)
TRIGL SERPL-MCNC: 106 MG/DL (ref ?–150)
VLDLC SERPL CALC-MCNC: 21.2 MG/DL

## 2022-01-26 PROCEDURE — 80061 LIPID PANEL: CPT

## 2022-01-26 PROCEDURE — 80053 COMPREHEN METABOLIC PANEL: CPT

## 2022-01-26 PROCEDURE — 36415 COLL VENOUS BLD VENIPUNCTURE: CPT | Performed by: FAMILY MEDICINE

## 2022-01-26 PROCEDURE — 83036 HEMOGLOBIN GLYCOSYLATED A1C: CPT

## 2022-01-26 PROCEDURE — 86803 HEPATITIS C AB TEST: CPT

## 2022-01-26 NOTE — PROGRESS NOTES
Polo Degroot 1970 came in to have blood drawn. Name/ verified. Venipuncture performed on right arm. Pt tolerated it well.      Jessica Shipman LPN

## 2022-01-27 ENCOUNTER — TELEPHONE (OUTPATIENT)
Dept: PHYSICAL THERAPY | Age: 52
End: 2022-01-27

## 2022-01-27 LAB
HCV AB SER IA-ACNC: 0.1 INDEX
HCV AB SERPL QL IA: NEGATIVE
HCV COMMENT,HCGAC: NORMAL

## 2022-02-01 ENCOUNTER — OFFICE VISIT (OUTPATIENT)
Dept: FAMILY MEDICINE CLINIC | Age: 52
End: 2022-02-01
Payer: COMMERCIAL

## 2022-02-01 VITALS
WEIGHT: 279 LBS | HEIGHT: 65 IN | HEART RATE: 86 BPM | BODY MASS INDEX: 46.48 KG/M2 | RESPIRATION RATE: 14 BRPM | DIASTOLIC BLOOD PRESSURE: 86 MMHG | SYSTOLIC BLOOD PRESSURE: 130 MMHG | OXYGEN SATURATION: 99 % | TEMPERATURE: 98.1 F

## 2022-02-01 DIAGNOSIS — E11.65 TYPE 2 DIABETES MELLITUS WITH HYPERGLYCEMIA, WITHOUT LONG-TERM CURRENT USE OF INSULIN (HCC): Primary | ICD-10-CM

## 2022-02-01 DIAGNOSIS — M25.551 RIGHT HIP PAIN: ICD-10-CM

## 2022-02-01 DIAGNOSIS — I10 ESSENTIAL HYPERTENSION: ICD-10-CM

## 2022-02-01 DIAGNOSIS — E78.5 HYPERLIPIDEMIA, UNSPECIFIED HYPERLIPIDEMIA TYPE: ICD-10-CM

## 2022-02-01 DIAGNOSIS — E66.01 MORBID OBESITY (HCC): ICD-10-CM

## 2022-02-01 PROCEDURE — 99214 OFFICE O/P EST MOD 30 MIN: CPT | Performed by: FAMILY MEDICINE

## 2022-02-01 PROCEDURE — 3051F HG A1C>EQUAL 7.0%<8.0%: CPT | Performed by: FAMILY MEDICINE

## 2022-02-01 RX ORDER — IBUPROFEN 400 MG/1
600 TABLET ORAL
Qty: 60 TABLET | Refills: 0 | Status: SHIPPED | OUTPATIENT
Start: 2022-02-01

## 2022-02-01 RX ORDER — BIOTIN 10 MG
TABLET ORAL
COMMUNITY
Start: 2022-01-23 | End: 2022-03-03

## 2022-02-01 NOTE — PROGRESS NOTES
Junior Carcamo (: 1970) is a 49 y.o. female, established patient, here for:    ASSESSMENT/PLAN:  1. Type 2 diabetes mellitus with hyperglycemia, without long-term current use of insulin (HCC)  Assessment & Plan:  Uncontrolled. Had not tolerated metformin previously. Dulaglutide 0.75 mg weekly. We discussed nausea as relatively common  likely to improve over time. No personal history medullary cancer of thyroid. Neither is there a history of cancer syndromes (MEN). Schedule eye exam. follow up 3 months labs prior    Orders:  -     dulaglutide (TRULICITY) 7.06 LI/9.2 mL sub-q pen; 0.5 mL by SubCUTAneous route every seven (7) days. , Normal, Disp-12 Each, R-1  -     HEMOGLOBIN A1C WITH EAG; Future  -     METABOLIC PANEL, COMPREHENSIVE; Future  2. Morbid obesity (HCC)  -     dulaglutide (TRULICITY) 3.98 ZF/6.7 mL sub-q pen; 0.5 mL by SubCUTAneous route every seven (7) days. , Normal, Disp-12 Each, R-1  3. Essential hypertension  Assessment & Plan:  Not well controlled. Continue present management with enalapril, amlodipine and chlorthalidone pending impact of GLP1 agonist on DM and obesity  4. Hyperlipidemia, unspecified hyperlipidemia type  Assessment & Plan:  Well controlled, continue present management with atorvastatin  5. Right hip pain  Assessment & Plan:  New. Uncontrolled. Cautioned NSAID use with hypertension. Judicious use pending formal eval in dedicated visit with me   Orders:  -     ibuprofen (MOTRIN) 400 mg tablet; Take 1.5 Tablets by mouth every six (6) hours as needed for Pain., Normal, Disp-60 Tablet, R-0        [place] is scheduled  Schedule pap    Return in about 3 months (around 2022) for diabetes follow up, labs 1 week prior, (30); separate visit for hip pain as needed. SUBJECTIVE/OBJECTIVE:  HPI  Recently recovered from mild COVID. Doing well    Right hip pain when doing HEP for low back rx from PT. Formal PT is done.       follow up diet controlled DM  follow up hypertension  follow up hyperlipidemia      No shortness of breath, chest pain, numbness or tingling in feet    Results for orders placed or performed during the hospital encounter of 01/26/22   HEMOGLOBIN A1C WITH EAG   Result Value Ref Range    Hemoglobin A1c 7.7 (H) 4.2 - 5.6 %    Est. average glucose 174 mg/dL   LIPID PANEL W/ REFLX DIRECT LDL   Result Value Ref Range    LIPID PROFILE          Cholesterol, total 165 <200 MG/DL    Triglyceride 106 <150 MG/DL    HDL Cholesterol 47 40 - 60 MG/DL    LDL, calculated 96.8 0 - 100 MG/DL    VLDL, calculated 21.2 MG/DL    CHOL/HDL Ratio 3.5 0 - 5.0     METABOLIC PANEL, COMPREHENSIVE   Result Value Ref Range    Sodium 135 (L) 136 - 145 mmol/L    Potassium 3.8 3.5 - 5.5 mmol/L    Chloride 97 (L) 100 - 111 mmol/L    CO2 27 21 - 32 mmol/L    Anion gap 11 3.0 - 18 mmol/L    Glucose 203 (H) 74 - 99 mg/dL    BUN 9 7.0 - 18 MG/DL    Creatinine 0.82 0.6 - 1.3 MG/DL    BUN/Creatinine ratio 11 (L) 12 - 20      GFR est AA >60 >60 ml/min/1.73m2    GFR est non-AA >60 >60 ml/min/1.73m2    Calcium 9.4 8.5 - 10.1 MG/DL    Bilirubin, total 0.9 0.2 - 1.0 MG/DL    ALT (SGPT) 28 13 - 56 U/L    AST (SGOT) 15 10 - 38 U/L    Alk. phosphatase 77 45 - 117 U/L    Protein, total 7.6 6.4 - 8.2 g/dL    Albumin 3.9 3.4 - 5.0 g/dL    Globulin 3.7 2.0 - 4.0 g/dL    A-G Ratio 1.1 0.8 - 1.7     HEPATITIS C AB   Result Value Ref Range    Hepatitis C virus Ab 0.1 <0.80 Index    Hep C virus Ab Interp. Negative NEG      Hep C  virus Ab comment               Physical Exam  Constitutional:       General: She is not in acute distress. Appearance: She is well-developed. She is obese. HENT:      Head: Normocephalic and atraumatic. Pulmonary:      Effort: Pulmonary effort is normal.   Neurological:      Mental Status: She is alert and oriented to person, place, and time. Psychiatric:         Behavior: Behavior normal.         Thought Content:  Thought content normal.         Judgment: Judgment normal.               -- Tacho Boswell Skye Mao MD

## 2022-02-01 NOTE — ASSESSMENT & PLAN NOTE
New. Uncontrolled. Cautioned NSAID use with hypertension.  Judicious use pending formal eval in dedicated visit with me

## 2022-02-01 NOTE — PROGRESS NOTES
Patient here today to be seen for her 6 month chronic condition follow up and lab review. She is asking if she can get a prescription for Ibuprofen 800 mg tab for her hip pain. 1. \"Have you been to the ER, urgent care clinic since your last visit? Hospitalized since your last visit? \" No    2. \"Have you seen or consulted any other health care providers outside of the [address] [place] [address] since your last visit? \" No     3. For patients aged 39-70: Has the patient had a colonoscopy / FIT/ Cologuard? Yes - Care Gap present. OVERDUE! Scheduled in March      If the patient is female:    4. For patients aged 41-77: Has the patient had a mammogram within the past 2 years? Yes - no Care Gap present      5. For patients aged 21-65: Has the patient had a pap smear? Yes - Care Gap present. OVERDUE!

## 2022-02-01 NOTE — ASSESSMENT & PLAN NOTE
Not well controlled.  Continue present management with enalapril, amlodipine and chlorthalidone pending impact of GLP1 agonist on DM and obesity

## 2022-02-01 NOTE — ASSESSMENT & PLAN NOTE
Uncontrolled. Had not tolerated metformin previously. Dulaglutide 0.75 mg weekly. We discussed nausea as relatively common  likely to improve over time. No personal history medullary cancer of thyroid. Neither is there a history of cancer syndromes (MEN).  Schedule eye exam. follow up 3 months labs prior

## 2022-02-12 DIAGNOSIS — I10 ESSENTIAL HYPERTENSION: ICD-10-CM

## 2022-02-14 DIAGNOSIS — E66.01 MORBID OBESITY (HCC): ICD-10-CM

## 2022-02-14 DIAGNOSIS — E11.65 TYPE 2 DIABETES MELLITUS WITH HYPERGLYCEMIA, WITHOUT LONG-TERM CURRENT USE OF INSULIN (HCC): ICD-10-CM

## 2022-02-14 RX ORDER — ENALAPRIL MALEATE 20 MG/1
40 TABLET ORAL DAILY
Qty: 180 TABLET | Refills: 2 | Status: SHIPPED | OUTPATIENT
Start: 2022-02-14 | End: 2022-05-10 | Stop reason: SDUPTHER

## 2022-02-14 NOTE — TELEPHONE ENCOUNTER
Patient is requesting refills to be sent to her mail order pharmacy. She was seen for her chronic condition follow up on 2/1/22 with 3 month f/u plan labs prior.    Medication was last sent in to her local pharmacy on 8/3/21

## 2022-02-14 NOTE — TELEPHONE ENCOUNTER
This pharmacy faxed over request for the following prescriptions to be filled: Requesting a 90 Day  Supply with 3 refills    Medication requested :  Requested Prescriptions     Pending Prescriptions Disp Refills    dulaglutide (TRULICITY) 9.52 OB/2.0 mL sub-q pen 12 Each 1     Si.5 mL by SubCUTAneous route every seven (7) days.      PCP: Dr. David Rider or Print: [facility]  Mail order or Local pharmacy [phone]    Scheduled appointment if not seen by current providers in office:LOV 22 Upcoming 05/10/22

## 2022-02-15 NOTE — TELEPHONE ENCOUNTER
Called patient  verified she says she is requesting that her Trulicity be sent in to [address] She was just seen on 22 med was sent in same day to local pharmacy 12 with 1 refill, patient is only to get one pen at a time if sent to local pharmacy.

## 2022-03-03 RX ORDER — ACETAMINOPHEN 500 MG
2000 TABLET ORAL DAILY
COMMUNITY

## 2022-03-03 NOTE — PERIOP NOTES
PRE-SURGICAL INSTRUCTIONS        Patient's Name:  Brenda YOUNG'H Date:  3/3/2022            Covid Testing Date and Time:  Per [facility]    Surgery Date:  3/8/2022                1. Do NOT eat or drink anything, including candy, gum, or ice chips after midnight on 3/8, unless you have specific instructions from your surgeon or anesthesia provider to do so.  2. You may brush your teeth before coming to the hospital.  3. No smoking 24 hours prior to the day of surgery. 4. No alcohol 24 hours prior to the day of surgery. 5. No recreational drugs for one week prior to the day of surgery. 6. Leave all valuables, including money/purse, at home. 7. Remove all jewelry, nail polish, acrylic nails, and makeup (including mascara); no lotions powders, deodorant, or perfume/cologne/after shave on the skin. 8. Follow instruction for Hibiclens washes and CHG wipes from surgeon's office. 9. Glasses/contact lenses and dentures may be worn to the hospital.  They will be removed prior to surgery. 10. Call your doctor if symptoms of a cold or illness develop within 24-48 hours prior to your surgery. 11.  If you are having an outpatient procedure, please make arrangements for a responsible ADULT TO [address] [place] [address] and stay with you for 24 hours after your surgery. 12. ONE VISITOR in the hospital at this time for outpatient procedures. Exceptions may be made for surgical admissions, per nursing unit guidelines      Special Instructions:      Bring list of CURRENT medications. Bring any pertinent legal medical records. Take these medications the morning of surgery with a sip of water:  As directed by MD  Follow physician instructions about insulin. Follow physician instructions about stopping anticoagulants. Complete bowel prep per MD instructions. On the day of surgery, come in the main entrance of [facility]. Let the  at the desk know you are there for surgery.   A staff member will come escort you to the surgical area on the second floor. If you have any questions or concerns, please do not hesitate to call:     (Prior to the day of surgery) [facility] department:  [phone]   (Day of surgery) Pre-Op department:  [phone]    These surgical instructions were reviewed with Carlos Packer during the [facility] phone call.

## 2022-03-07 ENCOUNTER — ANESTHESIA EVENT (OUTPATIENT)
Dept: ENDOSCOPY | Age: 52
End: 2022-03-07
Payer: COMMERCIAL

## 2022-03-08 ENCOUNTER — HOSPITAL ENCOUNTER (OUTPATIENT)
Age: 52
Setting detail: OUTPATIENT SURGERY
Discharge: HOME | End: 2022-03-08
Attending: INTERNAL MEDICINE | Admitting: INTERNAL MEDICINE
Payer: COMMERCIAL

## 2022-03-08 ENCOUNTER — ANESTHESIA (OUTPATIENT)
Dept: ENDOSCOPY | Age: 52
End: 2022-03-08
Payer: COMMERCIAL

## 2022-03-08 VITALS
HEIGHT: 65 IN | RESPIRATION RATE: 16 BRPM | DIASTOLIC BLOOD PRESSURE: 81 MMHG | OXYGEN SATURATION: 98 % | BODY MASS INDEX: 45.15 KG/M2 | TEMPERATURE: 98.3 F | WEIGHT: 271 LBS | SYSTOLIC BLOOD PRESSURE: 126 MMHG | HEART RATE: 78 BPM

## 2022-03-08 LAB
GLUCOSE BLD STRIP.AUTO-MCNC: 115 MG/DL (ref 70–110)
GLUCOSE BLD STRIP.AUTO-MCNC: 90 MG/DL (ref 70–110)
HCG UR QL: NEGATIVE

## 2022-03-08 PROCEDURE — 00811 ANES LWR INTST NDSC NOS: CPT | Performed by: NURSE ANESTHETIST, CERTIFIED REGISTERED

## 2022-03-08 PROCEDURE — 76040000019: Performed by: INTERNAL MEDICINE

## 2022-03-08 PROCEDURE — 2709999900 HC NON-CHARGEABLE SUPPLY: Performed by: INTERNAL MEDICINE

## 2022-03-08 PROCEDURE — 00811 ANES LWR INTST NDSC NOS: CPT | Performed by: ANESTHESIOLOGY

## 2022-03-08 PROCEDURE — 74011250637 HC RX REV CODE- 250/637: Performed by: NURSE ANESTHETIST, CERTIFIED REGISTERED

## 2022-03-08 PROCEDURE — 77030008565 HC TBNG SUC IRR ERBE -B: Performed by: INTERNAL MEDICINE

## 2022-03-08 PROCEDURE — 81025 URINE PREGNANCY TEST: CPT

## 2022-03-08 PROCEDURE — 82962 GLUCOSE BLOOD TEST: CPT

## 2022-03-08 PROCEDURE — 76060000031 HC ANESTHESIA FIRST 0.5 HR: Performed by: INTERNAL MEDICINE

## 2022-03-08 PROCEDURE — 74011250636 HC RX REV CODE- 250/636: Performed by: NURSE ANESTHETIST, CERTIFIED REGISTERED

## 2022-03-08 RX ORDER — SODIUM CHLORIDE, SODIUM LACTATE, POTASSIUM CHLORIDE, CALCIUM CHLORIDE 600; 310; 30; 20 MG/100ML; MG/100ML; MG/100ML; MG/100ML
50 INJECTION, SOLUTION INTRAVENOUS CONTINUOUS
Status: DISCONTINUED | OUTPATIENT
Start: 2022-03-08 | End: 2022-03-08 | Stop reason: HOSPADM

## 2022-03-08 RX ORDER — LIDOCAINE HYDROCHLORIDE 10 MG/ML
0.1 INJECTION, SOLUTION EPIDURAL; INFILTRATION; INTRACAUDAL; PERINEURAL AS NEEDED
Status: DISCONTINUED | OUTPATIENT
Start: 2022-03-08 | End: 2022-03-08 | Stop reason: HOSPADM

## 2022-03-08 RX ORDER — PROPOFOL 10 MG/ML
INJECTION, EMULSION INTRAVENOUS AS NEEDED
Status: DISCONTINUED | OUTPATIENT
Start: 2022-03-08 | End: 2022-03-08 | Stop reason: HOSPADM

## 2022-03-08 RX ORDER — FAMOTIDINE 20 MG/1
20 TABLET, FILM COATED ORAL ONCE
Status: COMPLETED | OUTPATIENT
Start: 2022-03-08 | End: 2022-03-08

## 2022-03-08 RX ADMIN — FAMOTIDINE 20 MG: 20 TABLET, FILM COATED ORAL at 09:39

## 2022-03-08 RX ADMIN — SODIUM CHLORIDE, POTASSIUM CHLORIDE, SODIUM LACTATE AND CALCIUM CHLORIDE 50 ML/HR: 600; 310; 30; 20 INJECTION, SOLUTION INTRAVENOUS at 09:39

## 2022-03-08 RX ADMIN — PROPOFOL 20 MG: 10 INJECTION, EMULSION INTRAVENOUS at 10:39

## 2022-03-08 RX ADMIN — PROPOFOL 20 MG: 10 INJECTION, EMULSION INTRAVENOUS at 10:41

## 2022-03-08 RX ADMIN — PROPOFOL 70 MG: 10 INJECTION, EMULSION INTRAVENOUS at 10:37

## 2022-03-08 NOTE — ANESTHESIA PREPROCEDURE EVALUATION
Relevant Problems   CARDIOVASCULAR   (+) Essential hypertension      ENDOCRINE   (+) Morbid obesity (HCC)   (+) Type 2 diabetes mellitus with hyperglycemia, without long-term current use of insulin (HCC)       Anesthetic History   No history of anesthetic complications            Review of Systems / Medical History  Patient summary reviewed and pertinent labs reviewed    Pulmonary            Asthma (Last attack ~ 1 year ago) : well controlled       Neuro/Psych         Psychiatric history (Depression)     Cardiovascular    Hypertension              Exercise tolerance: >4 METS     GI/Hepatic/Renal  Within defined limits              Endo/Other    Diabetes: well controlled, type 2    Morbid obesity     Other Findings              Physical Exam    Airway  Mallampati: II  TM Distance: 4 - 6 cm  Neck ROM: normal range of motion   Mouth opening: Normal     Cardiovascular  Regular rate and rhythm,  S1 and S2 normal,  no murmur, click, rub, or gallop             Dental  No notable dental hx       Pulmonary  Breath sounds clear to auscultation               Abdominal         Other Findings            Anesthetic Plan    ASA: 3  Anesthesia type: MAC          Induction: Intravenous  Anesthetic plan and risks discussed with: Patient

## 2022-03-08 NOTE — H&P
[email or web address] [address]  [place], [address]        Impression:   1.crcs      Plan:     1. [place] mac all risks benefits and alt discussed       Chief Complaint: crcs      HPI:  Polo Degroot is a 47 y.o. female who is being seen on consult for crcs. PMH:   Past Medical History:   Diagnosis Date    Asthma     Blood transfusion     Cervical dysplasia     colposcopy surveillance, normal since    COVID-19 2022    sore throat    Depression     Hypertension     IUD (intrauterine device) in place 2012    Mirena    Morbid obesity ([facility] [address]        PSH:   Past Surgical History:   Procedure Laterality Date    HX APPENDECTOMY      HX BACK SURGERY      right L4-5 hemilaminectomy, cauda equina syndrome    HX GI      partial small intestine removed    HX GYN      left fallopian tube removed    HX GYN          HX GYN          HX ORTHOPAEDIC  2004    fractured ankle       Social HX:   Social History     Socioeconomic History    Marital status:      Spouse name: Not on file    Number of children: Not on file    Years of education: Not on file    Highest education level: Not on file   Occupational History    Not on file   Tobacco Use    Smoking status: Never Smoker    Smokeless tobacco: Never Used   Substance and Sexual Activity    Alcohol use: Yes     Alcohol/week: 1.0 standard drink     Types: 1 Glasses of wine per week     Comment: occasional    Drug use: No    Sexual activity: Yes     Partners: Male     Birth control/protection: I.U.D.    Other Topics Concern    Not on file   Social History Narrative    Not on file     Social Determinants of Health     Financial Resource Strain:     Difficulty of Paying Living Expenses: Not on file   Food Insecurity:     Worried About Running Out of Food in the Last Year: Not on file    Radha of Food in the Last Year: Not on file Transportation Needs:     Lack of Transportation (Medical): Not on file    Lack of Transportation (Non-Medical): Not on file   Physical Activity:     Days of Exercise per Week: Not on file    Minutes of Exercise per Session: Not on file   Stress:     Feeling of Stress : Not on file   Social Connections:     Frequency of Communication with Friends and Family: Not on file    Frequency of Social Gatherings with Friends and Family: Not on file    Attends Mu-ism Services: Not on file    Active Member of Clubs or Organizations: Not on file    Attends Club or Organization Meetings: Not on file    Marital Status: Not on file   Intimate Partner Violence:     Fear of Current or Ex-Partner: Not on file    Emotionally Abused: Not on file    Physically Abused: Not on file    Sexually Abused: Not on file   Housing Stability:     Unable to Pay for Housing in the Last Year: Not on file    Number of [place] in the Last Year: Not on file    Unstable Housing in the Last Year: Not on file       FHX:   Family History   Problem Relation Age of Onset    Diabetes Mother     Hypertension Mother     Stroke Mother     Coronary Art Dis Mother 54    Alcohol abuse Father     Hypertension Father     Diabetes Father     Heart Failure Father         CHF    Colon Cancer Father 77    Colon Cancer Paternal Uncle     Colon Cancer Maternal Grandfather     Ovarian Cancer Maternal Aunt     Other Son         autism spectrum, ADD       Allergy:   Allergies   Allergen Reactions    Metformin Nausea and Vomiting       Home Medications:     Medications Prior to Admission   Medication Sig    cholecalciferol (VITAMIN D3) (2,000 UNITS /50 MCG) cap capsule Take 2,000 Units by mouth daily.  dulaglutide (TRULICITY) 7.66 MF/0.2 mL sub-q pen 0.5 mL by SubCUTAneous route every seven (7) days.  (Patient taking differently: 0.75 mg by SubCUTAneous route Every Thursday.)    enalapril (VASOTEC) 20 mg tablet Take 2 Tablets by mouth daily.  glucosam/stanley-msm1/C/osiris/bosw (OSTEO BI-FLEX TRIPLE STRENGTH PO)     ibuprofen (MOTRIN) 400 mg tablet Take 1.5 Tablets by mouth every six (6) hours as needed for Pain.  melatonin 10 mg capsule Take  by mouth nightly as needed.  chlorthalidone (HYGROTON) 25 mg tablet Take 1 Tablet by mouth daily.  atorvastatin (LIPITOR) 20 mg tablet Take 1 Tablet by mouth daily.  amLODIPine (NORVASC) 10 mg tablet TAKE ONE TABLET BY MOUTH DAILY    multivitamin (ONE A DAY) tablet Take 1 Tab by mouth daily.  MIRENA 20 mcg/24 hr (5 years) IUD     CYANOCOBALAMIN, VITAMIN B-12, (VITAMIN B-12 PO) Take 500 mcg by mouth daily. Review of Systems:     Constitutional: No fevers, chills, weight loss, fatigue. Skin: No rashes, pruritis, jaundice, ulcerations, erythema. HENT: No headaches, nosebleeds, sinus pressure, rhinorrhea, sore throat. Eyes: No visual changes, blurred vision, eye pain, photophobia, jaundice. Cardiovascular: No chest pain, heart palpitations. Respiratory: No cough, SOB, wheezing, chest discomfort, orthopnea. Gastrointestinal: neg   Genitourinary: No dysuria, bleeding, discharge, pyuria. Musculoskeletal: No weakness, arthralgias, wasting. Endo: No sweats. Heme: No bruising, easy bleeding. Allergies: As noted. Neurological: Cranial nerves intact. Alert and oriented. Gait not assessed. Psychiatric:  No anxiety, depression, hallucinations. Visit Vitals  /82 (BP 1 Location: Right upper arm, BP Patient Position: At rest)   Pulse 93   Temp 98.4 °F (36.9 °C)   Resp 18   Ht 5' 5\" (1.651 m)   Wt 122.9 kg (271 lb)   SpO2 97%   Breastfeeding No   BMI 45.10 kg/m²       Physical Assessment:     constitutional: appearance: well developed, well nourished, normal habitus, no deformities, in no acute distress. skin: inspection: no rashes, ulcers, icterus or other lesions; no clubbing or telangiectasias. palpation: no induration or subcutaneos nodules. eyes: inspection: normal conjunctivae and lids; no jaundice pupils: symmetrical, normoreactive to light, normal accommodation and size. ENMT: mouth: normal oral mucosa,lips and gums; good dentition. oropharynx: normal tongue, hard and soft palate; posterior pharynx without erythema, exudate or lesions. neck: no masses organomegaly or tenderness. respiratory: effort: normal chest excursion; no intercostal retraction or accessory muscle use. cardiovascular: abdominal aorta: normal size and position; no bruits. palpation: PMI of normal size and position; normal rhythm; no thrill or murmurs. abdominal: abdomen: normal consistency; no tenderness or masses. hernias: no hernias appreciated. liver: normal size and consistency. spleen: not palpable. rectal: hemoccult/guaiac: not performed. musculoskeletal: no deformities or muscle wasting   lymphatic: axilae: not palpable. groin: not palpable. neck: within normal limits. other: not palpable. neurologic: cranial nerves: II-XII normal.   psychiatric: judgement/insight: within normal limits. memory: within normal limits for recent and remote events. mood and affect: no evidence of depression, anxiety or agitation. orientation: oriented to time, space and person. Basic Metabolic Profile   No results for input(s): NA, K, CL, CO2, BUN, GLU, CA, MG, PHOS in the last 72 hours. No lab exists for component: CREAT      CBC w/Diff    No results for input(s): WBC, RBC, HGB, HCT, MCV, MCH, MCHC, RDW, PLT, HGBEXT, HCTEXT, PLTEXT in the last 72 hours. No lab exists for component: MPV No results for input(s): GRANS, LYMPH, EOS, PRO, MYELO, METAS, BLAST in the last 72 hours. No lab exists for component: MONO, BASO     Hepatic Function   No results for input(s): ALB, TP, TBILI, AP, AML, LPSE in the last 72 hours. No lab exists for component: DBILI, GPT, SGOT       Ary Cerda MD, M.D.    [facility] [place] [id number], [facility]  [phone]  [email or web address]

## 2022-03-08 NOTE — ANESTHESIA POSTPROCEDURE EVALUATION
Procedure(s):  COLONOSCOPY. MAC    Anesthesia Post Evaluation      Multimodal analgesia: multimodal analgesia used between 6 hours prior to anesthesia start to PACU discharge  Patient location during evaluation: bedside  Patient participation: complete - patient participated  Level of consciousness: awake  Pain score: 0  Pain management: adequate  Airway patency: patent  Anesthetic complications: no  Cardiovascular status: stable  Respiratory status: acceptable  Hydration status: acceptable  Post anesthesia nausea and vomiting:  controlled  Final Post Anesthesia Temperature Assessment:  Normothermia (36.0-37.5 degrees C)      INITIAL Post-op Vital signs:   Vitals Value Taken Time   /70 03/08/22 1130   Temp 36.7 °C (98.1 °F) 03/08/22 1056   Pulse 71 03/08/22 1132   Resp 15 03/08/22 1132   SpO2 97 % 03/08/22 1135   Vitals shown include unvalidated device data.

## 2022-03-08 NOTE — DISCHARGE INSTRUCTIONS
[facility] Colonoscopy: What to Expect at [address] [place] [address]  After you have a colonoscopy, you will stay at the clinic for 1 to 2 hours until the medicines wear off. Then you can go home. But you will need to arrange for a ride. Your doctor will tell you when you can eat and do your other usual activities. Your doctor will talk to you about when you will need your next colonoscopy. Your doctor can help you decide how often you need to be checked. This will depend on the results of your test and your risk for colorectal cancer. After the test, you may be bloated or have gas pains. You may need to pass gas. If a biopsy was done or a polyp was removed, you may have streaks of blood in your stool (feces) for a few days. This care sheet gives you a general idea about how long it will take for you to recover. But each person recovers at a different pace. Follow the steps below to get better as quickly as possible. How can you care for yourself at home? Activity  · Rest when you feel tired. · You can do your normal activities when it feels okay to do so. Diet  · Follow your doctor's directions for eating. · Unless your doctor has told you not to, drink plenty of fluids. This helps to replace the fluids that were lost during the colon prep. · Do not drink alcohol. Medicines  · If polyps were removed or a biopsy was done during the test, your doctor may tell you not to take aspirin or other anti-inflammatory medicines for a few days. These include ibuprofen (Advil, Motrin) and naproxen (Aleve). Other instructions  · For your safety, do not drive or operate machinery until the medicine wears off and you can think clearly. Your doctor may tell you not to drive or operate machinery until the day after your test.  · Do not sign legal documents or make major decisions until the medicine wears off and you can think clearly. The anesthesia can make it hard for you to fully understand what you are agreeing to.   Follow-up care is a key part of your treatment and safety. Be sure to make and go to all appointments, and call your doctor if you are having problems. It's also a good idea to know your test results and keep a list of the medicines you take. When should you call for help? Call 911 anytime you think you may need emergency care. For example, call if:  · You passed out (lost consciousness). · You pass maroon or bloody stools. · You have severe belly pain. Call your doctor now or seek immediate medical care if:  · Your stools are black and tarlike. · Your stools have streaks of blood, but you did not have a biopsy or any polyps removed. · You have belly pain, or your belly is swollen and firm. · You vomit. · You have a fever. · You are very dizzy. Watch closely for changes in your health, and be sure to contact your doctor if you have any problems. Where can you learn more? Go to PAK.be  Enter E264 in the search box to learn more about \"Colonoscopy: What to Expect at Home. \"   © 9049-6518 Healthwise, Incorporated. Care instructions adapted under license by [employer] (which disclaims liability or warranty for this information). This care instruction is for use with your licensed healthcare professional. If you have questions about a medical condition or this instruction, always ask your healthcare professional. [place] [address] any warranty or liability for your use of this information. Content Version: 87.8.298314; Current as of: November 14, 2014      DISCHARGE SUMMARY from Nurse     POST-PROCEDURE INSTRUCTIONS:    Call your Physician if you:  ? Observe any excess bleeding. ? Develop a temperature over 100.5o F.  ? Experience abdominal, shoulder or chest pain. ? Notice any signs of decreased circulation or nerve impairment to an extremity such as a change in color, persistent numbness, tingling, coldness or increase in pain. ?  Vomit blood or you have nausea and vomiting lasting longer than 4 hours. ? Are unable to take medications. ? Are unable to urinate within 8 hours after discharge following general anesthesia or intravenous sedation. For the next 24 hours after receiving general anesthesia or intravenous sedation, or while taking prescription Narcotics, limit your activities:  ? Do NOT drive a motor vehicle, operate hazard machinery or power tools, or perform tasks that require coordination. The medication you received during your procedure may have some effect on your mental awareness. ? Do NOT make important personal or business decisions. The medication you received during your procedure may have some effect on your mental awareness. ? Do NOT drink alcoholic beverages. These drinks do not mix well with the medications that have been given to you. ? Upon discharge from the hospital, you must be accompanied by a responsible adult. ? Resume your diet as directed by your physician. ? Resume medications as your physician has prescribed. ? Please give a list of your current medications to your Primary Care Provider. ? Please update this list whenever your medications are discontinued, doses are changed, or new medications (including over-the-counter products) are added. ? Please carry medication information at all times in case of emergency situations. These are general instructions for a healthy lifestyle:    No smoking/ No tobacco products/ Avoid exposure to second hand smoke.  Surgeon General's Warning:  Quitting smoking now greatly reduces serious risk to your health. Obesity, smoking, and a sedentary lifestyle greatly increase your risk for illness.    A healthy diet, regular physical exercise & weight monitoring are important for maintaining a healthy lifestyle   You may be retaining fluid if you have a history of heart failure or if you experience any of the following symptoms:  Weight gain of 3 pounds or more overnight or 5 pounds in a week, increased swelling in our hands or feet or shortness of breath while lying flat in bed. Please call your doctor as soon as you notice any of these symptoms; do not wait until your next office visit. Colorectal Screening   Colorectal cancer almost always develops from precancerous polyps (abnormal growths) in the colon or rectum. Screening tests can find precancerous polyps, so that they can be removed before they turn into cancer. Screening tests can also find colorectal cancer early, when treatment works best.  [place] Speak with your physician about when you should begin screening and how often you should be tested. Additional Information    Educational references and/or instructions provided during this visit included:    See attached. Patient Education        Learning About Diverticulosis and Diverticulitis  What are diverticulosis and diverticulitis? In diverticulosis and diverticulitis, pouches called diverticula form in the wall of the large intestine, or colon. · In diverticulosis, the pouches do not cause any pain or other symptoms. · In diverticulitis, the pouches get inflamed or infected and cause symptoms. Doctors aren't sure what causes these pouches in the colon. But they think that a low-fiber diet may play a role. Without fiber to add bulk to the stool, the colon has to work harder than normal to push the stool forward. The pressure from this may cause pouches to form in weak spots along the colon. Some people with diverticulosis get diverticulitis. But experts don't know why this happens. What are the symptoms? · In diverticulosis, most people don't have symptoms. But pouches sometimes bleed. · In diverticulitis, symptoms may last from a few hours to a week or more. They include:  ? Belly pain. This is usually in the lower left side. It is sometimes worse when you move. This is the most common symptom. ? Fever and chills. ? Bloating and gas.   ? Diarrhea or constipation. ? Nausea and sometimes vomiting.  ? Not feeling like eating. How can you prevent diverticulitis? You may be able to lower your chance of getting diverticulitis. You can do this by taking steps to prevent constipation. · Eat fruits, vegetables, beans, and whole grains every day. These foods are high in fiber. · Drink plenty of fluids. If you have kidney, heart, or liver disease and have to limit fluids, talk with your doctor before you increase the amount of fluids you drink. · Get at least 30 minutes of exercise on most days of the week. Walking is a good choice. You also may want to do other activities, such as running, swimming, cycling, or playing tennis or team sports. · Take a fiber supplement, such as Citrucel or Metamucil, every day if needed. Read and follow all instructions on the label. · Schedule time each day for a bowel movement. Having a daily routine may help. Take your time and do not strain when having a bowel movement. Some people avoid nuts, seeds, berries, and popcorn. They believe that these foods might get trapped in the diverticula and cause pain. But there is no proof that these foods cause diverticulitis or make it worse. How are these problems treated? · The best way to treat diverticulosis is to avoid constipation. · Treatment for diverticulitis includes antibiotics. It often includes a change in your diet. You may need only liquids at first. Your doctor may suggest pain medicines for pain or belly cramps. In some cases, surgery may be needed. Follow-up care is a key part of your treatment and safety. Be sure to make and go to all appointments, and call your doctor if you are having problems. It's also a good idea to know your test results and keep a list of the medicines you take. Where can you learn more?   Go to http://www.gray.com/  Enter J160 in the search box to learn more about \"Learning About Diverticulosis and Diverticulitis. \"  Current as of: September 8, 2021               Content Version: 13.2  © 3183-6575 [employer]. Care instructions adapted under license by [employer] (which disclaims liability or warranty for this information). If you have questions about a medical condition or this instruction, always ask your healthcare professional. [address] any warranty or liability for your use of this information. Patient Education        High-Fiber Diet: Care Instructions  Overview     A high-fiber diet may help you relieve constipation and feel less bloated. Your doctor and dietitian will help you make a high-fiber eating plan based on your personal needs. The plan will include the things you like to eat. It will also make sure that you get 25 to 35 grams of fiber a day. Before you make changes to the way you eat, be sure to talk with your doctor or dietitian. Follow-up care is a key part of your treatment and safety. Be sure to make and go to all appointments, and call your doctor if you are having problems. It's also a good idea to know your test results and keep a list of the medicines you take. How can you care for yourself at home? · You can increase how much fiber you get if you eat more of certain foods. These foods include:  ? Whole-grain breads and cereals. ? Fruits, such as pears, apples, and peaches. Eat the skins and peels if you can.  ? Vegetables, such as broccoli, cabbage, spinach, carrots, asparagus, and squash. ? Starchy vegetables. These include potatoes with skins, kidney beans, and lima beans. · Take a fiber supplement every day if your doctor recommends it. Examples are Benefiber, Citrucel, FiberCon, and Metamucil. Ask your doctor how much to take. · Drink plenty of fluids. If you have kidney, heart, or liver disease and have to limit fluids, talk with your doctor before you increase the amount of fluids you drink.   Where can you learn more?  Go to http://www.gray.com/  Enter K243 in the search box to learn more about \"High-Fiber Diet: Care Instructions. \"  Current as of: September 8, 2021               Content Version: 13.2  © 6863-2474 Healthwise, Incorporated. Care instructions adapted under license by [employer] (which disclaims liability or warranty for this information). If you have questions about a medical condition or this instruction, always ask your healthcare professional. [place] [address] any warranty or liability for your use of this information. APPOINTMENTS:    Per MD Instruction. Discharge information has been reviewed with the patient. The patient verbalized understanding.  .Patient armband removed and shredded

## 2022-03-09 NOTE — PROGRESS NOTES
[facility] at [address] [facility] [address] [facility] [facility], [address] [place] [address]  Phone: [phone]      Fax:  [phone]    Discharge Summary    Patient name: Maude Baxter Start of Care: 11/10/2021   Referral Trena Abraham MD ARTURO: 4/16/1411               Medical Diagnosis: Other low back pain [M54.59]  Right hip pain [M25.551]  Payor: BLUE CROSS / Plan: [address] [place] [address] / Product Type: PPO /  Onset Date: On or about 8/10/21 with start of exercise               Treatment Diagnosis: LBP and Right Hip Pain   Prior Hospitalization: see medical history Provider#: [id number]   Medications: Verified on Patient summary List   Comorbidities: DM, HTN, Asthma  Prior Level of Function: No right hip pain and very limited back pain      Visits from Start of Care: 5    Missed Visits: 1    Reporting Period : 11/10/21 to 12/13/21    Updated Goals: to be achieved in 10 treatments:  1.  Pt will report 50% improvement or better with low back dysfunction to show a significant increase in ability to tolerate ADL             PN: progressing 45% reported   Current: Subjective % improvement: 50% ; improvement of pain in hips, improvement in ascending and descending steps met  2. . Pt will have decreased pain to 2/10 or better with activity to allow her to return to playing with her grand child in the park             PN: slight progress, increased activity tolerance but continues to have 5-6/10 pain in right hip on \"bad\" days               Current: Progressing, 3/10 pain today    3.  Pt will ambulate on TM for 1/2 mile or more with no difficulty or added hip pain for carryover to walking for exercise with no difficulty         PN:  Pt amb approx 1.25 miles 4 times a week with moderate increase in discomfort post  4. Pt will improve FOTO score to 68 in 11 visits to show significant improvement in function for progress to performing usual daily activities.              PN:  not met, very slight decrease FOTO 56       Assessment/ Summary of Care: Patient has shown good progress with this treatment program. Pain as of last visit was 3/10. Patient has shown decreased pain and increased strength and mobility. Patient reports 50% improvement with overall involvement. FOTO score is 56. It has been over 30 days since patients last visit. Patient has failed to schedule any further appointments and will be discharged from therapy.         RECOMMENDATIONS:  [x]Discontinue therapy: []Patient has reached or is progressing toward set goals      [x]Patient is non-compliant or has abdicated      []Due to lack of appreciable progress towards set [address] [facility], [facility] 3/9/2022 3:46 PM  Chacorta Echavarria, MPT

## 2022-03-18 PROBLEM — Z98.890 HISTORY OF BACK SURGERY: Status: ACTIVE | Noted: 2021-11-01

## 2022-03-19 PROBLEM — M54.50 CHRONIC MIDLINE LOW BACK PAIN WITHOUT SCIATICA: Status: ACTIVE | Noted: 2021-11-01

## 2022-03-19 PROBLEM — M25.551 RIGHT HIP PAIN: Status: ACTIVE | Noted: 2022-02-01

## 2022-03-19 PROBLEM — G89.29 CHRONIC MIDLINE LOW BACK PAIN WITHOUT SCIATICA: Status: ACTIVE | Noted: 2021-11-01

## 2022-04-07 ENCOUNTER — HOSPITAL ENCOUNTER (OUTPATIENT)
Dept: GENERAL RADIOLOGY | Age: 52
Discharge: HOME | End: 2022-04-07
Payer: COMMERCIAL

## 2022-04-07 DIAGNOSIS — M54.16 LUMBAR RADICULOPATHY: ICD-10-CM

## 2022-04-07 DIAGNOSIS — M54.50 LOW BACK PAIN: ICD-10-CM

## 2022-04-07 PROCEDURE — 72110 X-RAY EXAM L-2 SPINE 4/>VWS: CPT

## 2022-04-20 ENCOUNTER — TRANSCRIBE ORDER (OUTPATIENT)
Dept: SCHEDULING | Age: 52
End: 2022-04-20

## 2022-04-20 DIAGNOSIS — M48.061 LUMBAR SPINAL STENOSIS: Primary | ICD-10-CM

## 2022-05-04 ENCOUNTER — CLINICAL SUPPORT (OUTPATIENT)
Dept: FAMILY MEDICINE CLINIC | Age: 52
End: 2022-05-04
Payer: COMMERCIAL

## 2022-05-04 ENCOUNTER — HOSPITAL ENCOUNTER (OUTPATIENT)
Age: 52
Discharge: HOME | End: 2022-05-04
Attending: PHYSICIAN ASSISTANT
Payer: COMMERCIAL

## 2022-05-04 ENCOUNTER — HOSPITAL ENCOUNTER (OUTPATIENT)
Dept: LAB | Age: 52
Discharge: HOME | End: 2022-05-04
Payer: COMMERCIAL

## 2022-05-04 DIAGNOSIS — E11.65 TYPE 2 DIABETES MELLITUS WITH HYPERGLYCEMIA, WITHOUT LONG-TERM CURRENT USE OF INSULIN (HCC): ICD-10-CM

## 2022-05-04 DIAGNOSIS — M48.061 LUMBAR SPINAL STENOSIS: ICD-10-CM

## 2022-05-04 DIAGNOSIS — E11.65 TYPE 2 DIABETES MELLITUS WITH HYPERGLYCEMIA, WITHOUT LONG-TERM CURRENT USE OF INSULIN (HCC): Primary | ICD-10-CM

## 2022-05-04 LAB
ALBUMIN SERPL-MCNC: 3.6 G/DL (ref 3.4–5)
ALBUMIN/GLOB SERPL: 1.1 {RATIO} (ref 0.8–1.7)
ALP SERPL-CCNC: 65 U/L (ref 45–117)
ALT SERPL-CCNC: 27 U/L (ref 13–56)
ANION GAP SERPL CALC-SCNC: 5 MMOL/L (ref 3–18)
AST SERPL-CCNC: 12 U/L (ref 10–38)
BILIRUB SERPL-MCNC: 0.4 MG/DL (ref 0.2–1)
BUN SERPL-MCNC: 11 MG/DL (ref 7–18)
BUN/CREAT SERPL: 16 (ref 12–20)
CALCIUM SERPL-MCNC: 9.1 MG/DL (ref 8.5–10.1)
CHLORIDE SERPL-SCNC: 103 MMOL/L (ref 100–111)
CO2 SERPL-SCNC: 30 MMOL/L (ref 21–32)
CREAT SERPL-MCNC: 0.7 MG/DL (ref 0.6–1.3)
CREAT UR-MCNC: 0.7 MG/DL (ref 0.6–1.3)
EST. AVERAGE GLUCOSE BLD GHB EST-MCNC: 126 MG/DL
GLOBULIN SER CALC-MCNC: 3.2 G/DL (ref 2–4)
GLUCOSE SERPL-MCNC: 105 MG/DL (ref 74–99)
HBA1C MFR BLD: 6 % (ref 4.2–5.6)
POTASSIUM SERPL-SCNC: 3.8 MMOL/L (ref 3.5–5.5)
PROT SERPL-MCNC: 6.8 G/DL (ref 6.4–8.2)
SODIUM SERPL-SCNC: 138 MMOL/L (ref 136–145)

## 2022-05-04 PROCEDURE — 82565 ASSAY OF CREATININE: CPT

## 2022-05-04 PROCEDURE — A9576 INJ PROHANCE MULTIPACK: HCPCS | Performed by: PHYSICIAN ASSISTANT

## 2022-05-04 PROCEDURE — 83036 HEMOGLOBIN GLYCOSYLATED A1C: CPT

## 2022-05-04 PROCEDURE — 36415 COLL VENOUS BLD VENIPUNCTURE: CPT

## 2022-05-04 PROCEDURE — 80053 COMPREHEN METABOLIC PANEL: CPT

## 2022-05-04 PROCEDURE — 72158 MRI LUMBAR SPINE W/O & W/DYE: CPT

## 2022-05-04 PROCEDURE — 36415 COLL VENOUS BLD VENIPUNCTURE: CPT | Performed by: FAMILY MEDICINE

## 2022-05-04 PROCEDURE — 74011250636 HC RX REV CODE- 250/636: Performed by: PHYSICIAN ASSISTANT

## 2022-05-04 RX ADMIN — GADOTERIDOL 20 ML: 279.3 INJECTION, SOLUTION INTRAVENOUS at 09:01

## 2022-05-04 NOTE — PROGRESS NOTES
Patient here today for [place] lab draw name and  verified venipuncture performed on patients right arm was successful patient tolerated well.

## 2022-05-10 ENCOUNTER — OFFICE VISIT (OUTPATIENT)
Dept: FAMILY MEDICINE CLINIC | Age: 52
End: 2022-05-10
Payer: COMMERCIAL

## 2022-05-10 VITALS
HEIGHT: 65 IN | DIASTOLIC BLOOD PRESSURE: 86 MMHG | HEART RATE: 91 BPM | SYSTOLIC BLOOD PRESSURE: 138 MMHG | OXYGEN SATURATION: 94 % | WEIGHT: 278 LBS | RESPIRATION RATE: 14 BRPM | TEMPERATURE: 97.6 F | BODY MASS INDEX: 46.32 KG/M2

## 2022-05-10 DIAGNOSIS — E66.01 MORBID OBESITY (HCC): ICD-10-CM

## 2022-05-10 DIAGNOSIS — E11.9 TYPE 2 DIABETES MELLITUS WITHOUT COMPLICATION, WITHOUT LONG-TERM CURRENT USE OF INSULIN (HCC): Primary | ICD-10-CM

## 2022-05-10 DIAGNOSIS — I10 ESSENTIAL HYPERTENSION: ICD-10-CM

## 2022-05-10 DIAGNOSIS — Z23 ENCOUNTER FOR IMMUNIZATION: ICD-10-CM

## 2022-05-10 DIAGNOSIS — E78.5 HYPERLIPIDEMIA, UNSPECIFIED HYPERLIPIDEMIA TYPE: ICD-10-CM

## 2022-05-10 PROCEDURE — 90471 IMMUNIZATION ADMIN: CPT | Performed by: FAMILY MEDICINE

## 2022-05-10 PROCEDURE — 99214 OFFICE O/P EST MOD 30 MIN: CPT | Performed by: FAMILY MEDICINE

## 2022-05-10 PROCEDURE — 3044F HG A1C LEVEL LT 7.0%: CPT | Performed by: FAMILY MEDICINE

## 2022-05-10 PROCEDURE — 90677 PCV20 VACCINE IM: CPT | Performed by: FAMILY MEDICINE

## 2022-05-10 RX ORDER — GABAPENTIN 300 MG/1
300 CAPSULE ORAL 3 TIMES DAILY
COMMUNITY
Start: 2022-04-19

## 2022-05-10 RX ORDER — ENALAPRIL MALEATE 20 MG/1
40 TABLET ORAL DAILY
Qty: 180 TABLET | Refills: 3 | Status: SHIPPED | OUTPATIENT
Start: 2022-05-10

## 2022-05-10 RX ORDER — AMLODIPINE BESYLATE 10 MG/1
TABLET ORAL
Qty: 90 TABLET | Refills: 3 | Status: SHIPPED | OUTPATIENT
Start: 2022-05-10

## 2022-05-10 RX ORDER — ATORVASTATIN CALCIUM 20 MG/1
20 TABLET, FILM COATED ORAL DAILY
Qty: 90 TABLET | Refills: 3 | Status: SHIPPED | OUTPATIENT
Start: 2022-05-10

## 2022-05-10 RX ORDER — CHLORTHALIDONE 25 MG/1
25 TABLET ORAL DAILY
Qty: 90 TABLET | Refills: 3 | Status: SHIPPED | OUTPATIENT
Start: 2022-05-10

## 2022-05-10 NOTE — PROGRESS NOTES
Archana Kaufman (: 1970) is a 47 y.o. female, established patient, here for:    ASSESSMENT/PLAN:  1. Type 2 diabetes mellitus without complication, without long-term current use of insulin ([facility]  Assessment & Plan:  Well controlled, continue present management with dulaglutide 0.75 unless/until weight plateau. Schedule eye exam.    Orders:  -     dulaglutide (TRULICITY) 8.73 RN/0.6 mL sub-q pen; 0.5 mL by SubCUTAneous route every seven (7) days. , Normal, Disp-12 Each, R-3  -     HEMOGLOBIN A1C WITH EAG; Future  -     METABOLIC PANEL, BASIC; Future  2. Morbid obesity ([facility]  Assessment & Plan:  Uncontrolled. Improving with dulaglutide. Continue. The Obesity Code. See also DM     Orders:  -     dulaglutide (TRULICITY) 6.36 VV/7.1 mL sub-q pen; 0.5 mL by SubCUTAneous route every seven (7) days. , Normal, Disp-12 Each, R-3  3. Essential hypertension  Assessment & Plan:  Not at target. Continue present management with enalapril, amlodipine and chlorthalidone pending full impact of GLP1 agonist on DM and obesity   Orders:  -     amLODIPine (NORVASC) 10 mg tablet; TAKE ONE TABLET BY MOUTH DAILY, Normal, Disp-90 Tablet, R-3  -     chlorthalidone (HYGROTON) 25 mg tablet; Take 1 Tablet by mouth daily. , Normal, Disp-90 Tablet, R-3  -     enalapril (VASOTEC) 20 mg tablet; Take 2 Tablets by mouth daily. , Normal, Disp-180 Tablet, R-3  -     METABOLIC PANEL, BASIC; Future  4. Hyperlipidemia, unspecified hyperlipidemia type  -     atorvastatin (LIPITOR) 20 mg tablet; Take 1 Tablet by mouth daily. , Normal, Disp-90 Tablet, R-3  5. Encounter for immunization  -     PNEUMOCOCCAL CONJUGATE PCV20, PF ([id number] [facility] )  -     [address] [employer] job with therapeutic lifestyle interventions! Return in about 6 months (around 11/10/2022) for chronic medical conditions, labs 1 week prior. SUBJECTIVE/OBJECTIVE:  HPI    follow up DM/morbid obesity - dulaglutide 0.75 - no side effects.  \"I feel so much better\" more energy  Ran/walked 5K in [place]! Sees herself doing more once her back is taken care of (in progress)  follow up hypertension - enalapril, amlodipine and chlorthalidone     Results for orders placed or performed during the hospital encounter of 05/04/22   HEMOGLOBIN A1C WITH EAG   Result Value Ref Range    Hemoglobin A1c 6.0 (H) 4.2 - 5.6 %    Est. average glucose 801 mg/dL   METABOLIC PANEL, COMPREHENSIVE   Result Value Ref Range    Sodium 138 136 - 145 mmol/L    Potassium 3.8 3.5 - 5.5 mmol/L    Chloride 103 100 - 111 mmol/L    CO2 30 21 - 32 mmol/L    Anion gap 5 3.0 - 18 mmol/L    Glucose 105 (H) 74 - 99 mg/dL    BUN 11 7.0 - 18 MG/DL    Creatinine 0.70 0.6 - 1.3 MG/DL    BUN/Creatinine ratio 16 12 - 20      GFR est AA >60 >60 ml/min/1.73m2    GFR est non-AA >60 >60 ml/min/1.73m2    Calcium 9.1 8.5 - 10.1 MG/DL    Bilirubin, total 0.4 0.2 - 1.0 MG/DL    ALT (SGPT) 27 13 - 56 U/L    AST (SGOT) 12 10 - 38 U/L    Alk. phosphatase 65 45 - 117 U/L    Protein, total 6.8 6.4 - 8.2 g/dL    Albumin 3.6 3.4 - 5.0 g/dL    Globulin 3.2 2.0 - 4.0 g/dL    A-G Ratio 1.1 0.8 - 1.7       Prior to Admission medications    Medication Sig Start Date End Date Taking? Authorizing Provider   gabapentin (NEURONTIN) 300 mg capsule Take 300 mg by mouth three (3) times daily. 4/19/22  Yes Provider, Historical   cholecalciferol (VITAMIN D3) (2,000 UNITS /50 MCG) cap capsule Take 2,000 Units by mouth daily. Yes Provider, Historical   dulaglutide (TRULICITY) 0.23 OQ/5.9 mL sub-q pen 0.5 mL by SubCUTAneous route every seven (7) days. Patient taking differently: 0.75 mg by SubCUTAneous route Every Thursday. 2/15/22  Yes Radha Ayala MD   enalapril (VASOTEC) 20 mg tablet Take 2 Tablets by mouth daily.  2/14/22  Yes Radha Ayala MD   glucosam/stanley-msm1/C/osiris/bosw (OSTEO BI-FLEX TRIPLE STRENGTH PO)  1/23/22  Yes Provider, Historical   ibuprofen (MOTRIN) 400 mg tablet Take 1.5 Tablets by mouth every six (6) hours as needed for Pain. 2/1/22  Yes Clary Santana MD   melatonin 10 mg capsule Take  by mouth nightly as needed. Yes Provider, Historical   chlorthalidone (HYGROTON) 25 mg tablet Take 1 Tablet by mouth daily. 8/20/21  Yes Clary Santana MD   atorvastatin (LIPITOR) 20 mg tablet Take 1 Tablet by mouth daily. 8/20/21  Yes Clary Santana MD   amLODIPine (NORVASC) 10 mg tablet TAKE ONE TABLET BY MOUTH DAILY 8/20/21  Yes Clary Santana MD   multivitamin (ONE A DAY) tablet Take 1 Tab by mouth daily. Yes Provider, Historical   CYANOCOBALAMIN, VITAMIN B-12, (VITAMIN B-12 PO) Take 500 mcg by mouth daily.    Yes Provider, Historical   MIRENA 20 mcg/24 hr (5 years) IUD  3/29/17   Provider, Historical       Physical Exam          -- Eneida Gibbs MD

## 2022-05-10 NOTE — PROGRESS NOTES
Patient here today to be seen for her 3 month follow up on DM and lab results. 1. \"Have you been to the ER, urgent care clinic since your last visit? Hospitalized since your last visit? \" No    2. \"Have you seen or consulted any other health care providers outside of the [address] [place] [address] since your last visit? \" Has seen Dr. Loraine Weiss spine spec. 3. For patients aged 39-70: Has the patient had a colonoscopy / FIT/ Cologuard? Yes - no Care Gap present      If the patient is female:    4. For patients aged 41-77: Has the patient had a mammogram within the past 2 years? Yes - no Care Gap present      5. For patients aged 21-65: Has the patient had a pap smear?  No

## 2022-05-10 NOTE — ASSESSMENT & PLAN NOTE
Well controlled, continue present management with dulaglutide 0.75 unless/until weight plateau.  Schedule eye exam.

## 2022-05-10 NOTE — ASSESSMENT & PLAN NOTE
Not at target.  Continue present management with enalapril, amlodipine and chlorthalidone pending full impact of GLP1 agonist on DM and obesity

## 2022-06-29 NOTE — PROGRESS NOTES
[facility]  [address], [address]  Phone: [phone]  Fax: [phone]        INITIAL CONSULTATION      HISTORY OF PRESENT ILLNESS:  Leonora Sevilla is a 47 y.o. female whom is referred from Rema Gibson [place] secondary to progressive low back pain into the lateral hips (R>L) x 1.5 years w/o injury. She rates her pain 7/10. Her pain is aggravated with prolonged positions. She admits to prior spinal surgery done through Dr. Caroline Mortensen in 2015. Prior to her surgery, she endorsed mainly back pain, notes some benefit with surgery. Admits her back pain became progressive 1.5 years ago w/o injury. Pt admits to taking Neurontin 300 mg QID with benefit but notes some drowsiness. Records indicated she previously tried Topamax. Pt admits to recent PT 3 months ago with some benefit. She is compliant with her HEP. Patient denies previous spinal injections or chiropractic care. Pt denies change in bowel or bladder habits. Pt denies fever, weight loss, or skin changes. PmHx of HTN, Obesity, DM, depression, spinal surgery. Right sided L4-5 hemilaminectomy. Partial small intestine removed. The patient has a history of DM and reports blood sugars are well controlled, consistently remaining below 200. Per pt, last A1c was a 5.7, managed by Shawnee Plata MD. Note from Rema Gibson PA-C  dated 5/31/2022 indicating patient was seen with c/o pain in the RLE >LLE. Soreness in the back. Notes numbness in the toes. Has difficulty with ambulation but notes improvement with Neurontin. Pain 7/10. Referred to me for right sided L4 and right sided L5 SNRBs L spine XR dated 4/7/2022 films independently reviewed. Per report, mild scoliosis with right convexity. DDD and DJD at L4-L5 and L5-S1. No instability. L spine MRI W/WO contrast dated 5/4/2022 films independently reviewed. Per report, Postsurgical findings at L4-5, much improved canal capacity.  No significant residual stenosis. There is no overt nerve impingement, although there is transient contact of crossing L5 nerves within respective lateral recesses. There is also transient distortion of crossing right S1 nerve within the right L5-S1 lateral recess. The patient is LHD.  reviewed. Body mass index is 45.43 kg/m². PCP: Aditi Clinton MD    Past Medical History:   Diagnosis Date    Asthma     Blood transfusion     Cervical dysplasia     colposcopy surveillance, normal since    COVID-19 2022    sore throat    Depression     Hypertension     IUD (intrauterine device) in place 2012    Mirena    Morbid obesity [facility])           Past Surgical History:   Procedure Laterality Date    COLONOSCOPY N/A 3/8/2022    COLONOSCOPY performed by Kiana Lopez MD at [facility]      right L4-5 hemilaminectomy, cauda equina syndrome    HX GI      partial small intestine removed    HX GYN      left fallopian tube removed    HX GYN          HX GYN          HX ORTHOPAEDIC  2004    fractured ankle         Social History     Tobacco Use    Smoking status: Never Smoker    Smokeless tobacco: Never Used   Substance Use Topics    Alcohol use: Yes     Alcohol/week: 1.0 standard drink     Types: 1 Glasses of wine per week     Comment: occasional       Work status: The patient is self employed. Marital status: N/A      Current Outpatient Medications   Medication Sig Dispense Refill    gabapentin (NEURONTIN) 300 mg capsule Take 300 mg by mouth three (3) times daily.  amLODIPine (NORVASC) 10 mg tablet TAKE ONE TABLET BY MOUTH DAILY 90 Tablet 3    atorvastatin (LIPITOR) 20 mg tablet Take 1 Tablet by mouth daily. 90 Tablet 3    chlorthalidone (HYGROTON) 25 mg tablet Take 1 Tablet by mouth daily. 90 Tablet 3    dulaglutide (TRULICITY) 7.71 AE/7.1 mL sub-q pen 0.5 mL by SubCUTAneous route every seven (7) days.  12 Each 3  enalapril (VASOTEC) 20 mg tablet Take 2 Tablets by mouth daily. 180 Tablet 3    cholecalciferol (VITAMIN D3) (2,000 UNITS /50 MCG) cap capsule Take 2,000 Units by mouth daily.  glucosam/stanley-msm1/C/osiris/bosw (OSTEO BI-FLEX TRIPLE STRENGTH PO)       ibuprofen (MOTRIN) 400 mg tablet Take 1.5 Tablets by mouth every six (6) hours as needed for Pain. 60 Tablet 0    melatonin 10 mg capsule Take  by mouth nightly as needed.  multivitamin (ONE A DAY) tablet Take 1 Tab by mouth daily.  CYANOCOBALAMIN, VITAMIN B-12, (VITAMIN B-12 PO) Take 500 mcg by mouth daily.  topiramate (TOPAMAX) 50 mg tablet PLEASE SEE ATTACHED FOR DETAILED DIRECTIONS (Patient not taking: Reported on 7/1/2022)      MIRENA 20 mcg/24 hr (5 years) IUD          Allergies   Allergen Reactions    Metformin Nausea and Vomiting            Family History   Problem Relation Age of Onset    Diabetes Mother     Hypertension Mother    Burch Stroke Mother     Coronary Art Dis Mother 54    Alcohol abuse Father     Hypertension Father     Diabetes Father     Heart Failure Father         CHF    Colon Cancer Father 77    Colon Cancer Paternal Uncle     Colon Cancer Maternal Grandfather     Ovarian Cancer Maternal Aunt     Other Son         autism spectrum, ADD         REVIEW OF SYSTEMS  Constitutional symptoms: Negative  Eyes: Negative  Ears, Nose, Throat, and Mouth: Negative  Cardiovascular: Negative  Respiratory: Negative  Genitourinary: Negative  Integumentary (Skin and/or breast): Negative  Musculoskeletal: Positive for low back pain into the bilateral hips  Extremities: Negative for edema.   Endocrine/Rheumatologic: Negative  Hematologic/Lymphatic: Negative  Allergic/Immunologic: Negative  Psychiatric: Negative       PHYSICAL EXAMINATION  Visit Vitals  Pulse 80   Temp 97.1 °F (36.2 °C) (Temporal)   Ht 5' 5\" (1.651 m)   Wt 273 lb (123.8 kg)   SpO2 99%   BMI 45.43 kg/m²       CONSTITUTIONAL: NAD, A&O x 3  HEART: Regular rate and rhythm  GASTROINTESTINAL: Positive bowel sounds, soft, nontender, and nondistended  LUNGS: Clear to auscultation bilaterally. SKIN: Negative for rash. Well healed midline incision  RANGE OF MOTION: The patient has full passive range of motion in all four extremities. SENSATION: Sensation is intact to light touch throughout. MOTOR:   Straight Leg Raise: Negative, bilateral  Renee: Negative, bilateral  Tandem Gait: Neg. Deep tendon reflexes are 0 at the biceps, 0 at the brachioradialis and 0 at the triceps, bilaterally. Deep tendon reflexes are 0 at the knees and 0 at the ankles bilaterally. Shoulder AB/Flex Elbow Flex Wrist Ext Elbow Ext Wrist Flex Hand Intrin Tone   Right +4/5 +4/5 +4/5 +4/5 +4/5 +4/5 +4/5   Left +4/5 +4/5 +4/5 +4/5 +4/5 +4/5 +4/5              Hip Flex Knee Ext Knee Flex Ankle DF GTE Ankle PF Tone   Right +4/5 +4/5 +4/5 +4/5 +4/5 +4/5 +4/5   Left +4/5 +4/5 +4/5 +4/5 +4/5 +4/5 +4/5       ASSESSMENT   Diagnoses and all orders for this visit:    1. Lumbar neuritis    2. Lumbar post-laminectomy syndrome    3. DDD (degenerative disc disease), lumbar       IMPRESSIONS/RECOMMENDATIONS:  Patient presents today with c/o progressive low back pain into the lateral hips (R>L). Multiple treatment options were discussed. Patient elected to proceed with blocks. I will order right sided L4 SNRB and right sided L5 SNRBs. Patient is neurologically intact. I will see the patient back prn. Written by Eldon Brenner, as dictated by Jean Claude Howard MD  I examined the patient, reviewed and agree with the note.

## 2022-07-01 ENCOUNTER — OFFICE VISIT (OUTPATIENT)
Dept: ORTHOPEDIC SURGERY | Age: 52
End: 2022-07-01
Payer: COMMERCIAL

## 2022-07-01 VITALS
OXYGEN SATURATION: 99 % | HEIGHT: 65 IN | BODY MASS INDEX: 45.48 KG/M2 | WEIGHT: 273 LBS | TEMPERATURE: 97.1 F | HEART RATE: 80 BPM

## 2022-07-01 DIAGNOSIS — M51.36 DDD (DEGENERATIVE DISC DISEASE), LUMBAR: ICD-10-CM

## 2022-07-01 DIAGNOSIS — M54.16 LUMBAR NEURITIS: Primary | ICD-10-CM

## 2022-07-01 DIAGNOSIS — M96.1 LUMBAR POST-LAMINECTOMY SYNDROME: ICD-10-CM

## 2022-07-01 PROCEDURE — 99204 OFFICE O/P NEW MOD 45 MIN: CPT | Performed by: PHYSICAL MEDICINE & REHABILITATION

## 2022-07-01 RX ORDER — TOPIRAMATE 50 MG/1
TABLET, FILM COATED ORAL
COMMUNITY
Start: 2022-03-25

## 2022-07-01 NOTE — LETTER
7/1/2022    Patient: Peyton Bradley   YOB: 1970   Date of Visit: 7/1/2022     [place] Kwok [place]  [address] [facility]  [address] [facility] [address]  Via In [address] [facility] [place] [id number]  Via In [facility] [address]    Dear MD Gold Taylor PA-C,      Thank you for referring Ms. Ronda Cano to [address] [place] [address] for evaluation. My notes for this consultation are attached. If you have questions, please do not hesitate to call me. I look forward to following your patient along with you.       Sincerely,    Romaine Richard MD

## 2022-07-21 ENCOUNTER — PATIENT MESSAGE (OUTPATIENT)
Dept: ORTHOPEDIC SURGERY | Age: 52
End: 2022-07-21

## 2022-07-21 NOTE — TELEPHONE ENCOUNTER
From: Yang Ernst  To: Alma Connolly MD  Sent: 7/21/2022 1:13 PM EDT  Subject: Waiting to be scheduled for block    I saw Dr. Mor Sales on July 1,2022. I was told someone would reach out to me to schedule the block. I was given the pre procedure paper. When will someon reach out to me?

## 2022-07-22 DIAGNOSIS — M51.36 DDD (DEGENERATIVE DISC DISEASE), LUMBAR: ICD-10-CM

## 2022-07-22 DIAGNOSIS — M54.16 LUMBAR NEURITIS: Primary | ICD-10-CM

## 2022-07-22 DIAGNOSIS — M96.1 LUMBAR POST-LAMINECTOMY SYNDROME: ICD-10-CM

## 2022-07-22 RX ORDER — DIAZEPAM 10 MG/1
TABLET ORAL
Qty: 1 TABLET | Refills: 0 | Status: SHIPPED | OUTPATIENT
Start: 2022-07-22

## 2022-10-22 DIAGNOSIS — M51.36 DDD (DEGENERATIVE DISC DISEASE), LUMBAR: ICD-10-CM

## 2022-10-22 DIAGNOSIS — M96.1 LUMBAR POST-LAMINECTOMY SYNDROME: ICD-10-CM

## 2022-10-22 DIAGNOSIS — M54.16 LUMBAR NEURITIS: ICD-10-CM

## 2022-10-24 RX ORDER — DIAZEPAM 10 MG/1
TABLET ORAL
Qty: 1 TABLET | OUTPATIENT
Start: 2022-10-24

## 2022-12-30 ENCOUNTER — CLINICAL SUPPORT (OUTPATIENT)
Dept: FAMILY MEDICINE CLINIC | Age: 52
End: 2022-12-30

## 2022-12-30 ENCOUNTER — HOSPITAL ENCOUNTER (OUTPATIENT)
Dept: LAB | Age: 52
End: 2022-12-30
Payer: COMMERCIAL

## 2022-12-30 DIAGNOSIS — E11.9 TYPE 2 DIABETES MELLITUS WITHOUT COMPLICATION, WITHOUT LONG-TERM CURRENT USE OF INSULIN (HCC): ICD-10-CM

## 2022-12-30 DIAGNOSIS — E11.9 TYPE 2 DIABETES MELLITUS WITHOUT COMPLICATION, WITHOUT LONG-TERM CURRENT USE OF INSULIN (HCC): Primary | ICD-10-CM

## 2022-12-30 LAB
EST. AVERAGE GLUCOSE BLD GHB EST-MCNC: 140 MG/DL
HBA1C MFR BLD: 6.5 % (ref 4.2–5.6)

## 2022-12-30 PROCEDURE — 83036 HEMOGLOBIN GLYCOSYLATED A1C: CPT

## 2022-12-30 PROCEDURE — 36415 COLL VENOUS BLD VENIPUNCTURE: CPT

## 2022-12-30 NOTE — PROGRESS NOTES
Performed venipuncture tried left arm was unsuccessful and was successful in the Right arm. Patient tolerated well.

## 2023-01-31 ENCOUNTER — HOSPITAL ENCOUNTER (OUTPATIENT)
Dept: LAB | Age: 53
Discharge: HOME | End: 2023-01-31
Payer: COMMERCIAL

## 2023-01-31 ENCOUNTER — OFFICE VISIT (OUTPATIENT)
Dept: FAMILY MEDICINE CLINIC | Age: 53
End: 2023-01-31
Payer: COMMERCIAL

## 2023-01-31 VITALS
TEMPERATURE: 98.8 F | RESPIRATION RATE: 16 BRPM | WEIGHT: 278 LBS | OXYGEN SATURATION: 99 % | DIASTOLIC BLOOD PRESSURE: 84 MMHG | BODY MASS INDEX: 46.32 KG/M2 | HEART RATE: 72 BPM | HEIGHT: 65 IN | SYSTOLIC BLOOD PRESSURE: 128 MMHG

## 2023-01-31 DIAGNOSIS — E78.5 HYPERLIPIDEMIA, UNSPECIFIED HYPERLIPIDEMIA TYPE: ICD-10-CM

## 2023-01-31 DIAGNOSIS — E11.9 TYPE 2 DIABETES MELLITUS WITHOUT COMPLICATION, WITHOUT LONG-TERM CURRENT USE OF INSULIN (HCC): ICD-10-CM

## 2023-01-31 DIAGNOSIS — E66.01 MORBID OBESITY (HCC): ICD-10-CM

## 2023-01-31 DIAGNOSIS — I10 ESSENTIAL HYPERTENSION: ICD-10-CM

## 2023-01-31 DIAGNOSIS — E11.9 TYPE 2 DIABETES MELLITUS WITHOUT COMPLICATION, WITHOUT LONG-TERM CURRENT USE OF INSULIN (HCC): Primary | ICD-10-CM

## 2023-01-31 DIAGNOSIS — E66.01 MORBID OBESITY WITH BMI OF 45.0-49.9, ADULT (HCC): ICD-10-CM

## 2023-01-31 LAB
CHOLEST SERPL-MCNC: 197 MG/DL
CREAT UR-MCNC: 175 MG/DL (ref 30–125)
HDLC SERPL-MCNC: 52 MG/DL (ref 40–60)
HDLC SERPL: 3.8 (ref 0–5)
LDLC SERPL CALC-MCNC: 128.2 MG/DL (ref 0–100)
LIPID PROFILE,FLP: ABNORMAL
MICROALBUMIN UR-MCNC: 1.1 MG/DL (ref 0–3)
MICROALBUMIN/CREAT UR-RTO: 6 MG/G (ref 0–30)
TRIGL SERPL-MCNC: 84 MG/DL (ref ?–150)
VLDLC SERPL CALC-MCNC: 16.8 MG/DL

## 2023-01-31 PROCEDURE — 82043 UR ALBUMIN QUANTITATIVE: CPT

## 2023-01-31 PROCEDURE — 3074F SYST BP LT 130 MM HG: CPT | Performed by: FAMILY MEDICINE

## 2023-01-31 PROCEDURE — 80061 LIPID PANEL: CPT

## 2023-01-31 PROCEDURE — 3079F DIAST BP 80-89 MM HG: CPT | Performed by: FAMILY MEDICINE

## 2023-01-31 RX ORDER — SEMAGLUTIDE 1.34 MG/ML
1 INJECTION, SOLUTION SUBCUTANEOUS
Qty: 3 EACH | Refills: 4 | Status: SHIPPED | OUTPATIENT
Start: 2023-01-31

## 2023-01-31 RX ORDER — SEMAGLUTIDE 1.34 MG/ML
1 INJECTION, SOLUTION SUBCUTANEOUS
Qty: 3 EACH | Refills: 4 | Status: SHIPPED | OUTPATIENT
Start: 2023-01-31 | End: 2023-01-31 | Stop reason: SDUPTHER

## 2023-01-31 NOTE — PROGRESS NOTES
Michaelle Spencer (: 1970) is a 47 y.o. female, established patient, here for:    ASSESSMENT/PLAN:  1. Type 2 diabetes mellitus without complication, without long-term current use of insulin ([facility]  Assessment & Plan: With morbid obesity. A1c well controlled at 6.5. Switching from dulaglutide 0.75 to semaglutide 1.0 to target uncontrolled obesity. Schedule eye exam  Orders:  -     LIPID PANEL W/ REFLX DIRECT LDL; Future  -     MICROALBUMIN, UR, RAND W/ MICROALB/CREAT RATIO; Future  -     semaglutide (Ozempic) 1 mg/dose (4 mg/3 mL) pnij; 1 mg by SubCUTAneous route every seven (7) days. Indications: type 2 diabetes mellitus, weight loss management for an obese person, Print, Disp-3 Each, R-4  2. Morbid obesity with BMI of 45.0-49.9, adult ([facility])  -     semaglutide (Ozempic) 1 mg/dose (4 mg/3 mL) pnij; 1 mg by SubCUTAneous route every seven (7) days. Indications: type 2 diabetes mellitus, weight loss management for an obese person, Print, Disp-3 Each, R-4  3. Essential hypertension  Assessment & Plan:  Well controlled. Continue present management with enalapril, amlodipine and chlorthalidone   4. Morbid obesity ([facility]  Assessment & Plan:  Uncontrolled. Working with nutrition. Switching to semaglutide. Not currently interested in bariatrics referral      Schedule pap    Return in about 3 months (around 2023) for chronic medical conditions, no labs prior, subject to change based on results, (15). SUBJECTIVE/OBJECTIVE:  HPI    follow up DM/morbid obesity - dulaglutide 0.75 - no side effects. Desires Ozempic. Working with nutritionist.   follow up hypertension - enalapril, amlodipine and chlorthalidone. Home pressures well controlled.        Lab Results   Component Value Date/Time    Hemoglobin A1c 6.5 (H) 2022 02:05 PM    Hemoglobin A1c 6.0 (H) 2022 09:10 AM    Hemoglobin A1c 7.7 (H) 2022 09:00 AM    Glucose 105 (H) 2022 09:10 AM    Glucose (POC) 90 2022 11:45 AM Microalbumin/Creat ratio (mg/g creat) 5 07/26/2021 05:53 PM    Microalbumin,urine random 0.76 07/26/2021 05:53 PM    LDL, calculated 96.8 01/26/2022 09:00 AM    Creatinine, POC 0.7 05/04/2022 08:20 AM    Creatinine 0.70 05/04/2022 09:10 AM           Physical Exam  Constitutional:       General: She is not in acute distress. Appearance: She is well-developed. HENT:      Head: Normocephalic and atraumatic. Pulmonary:      Effort: Pulmonary effort is normal.   Neurological:      Mental Status: She is alert and oriented to person, place, and time. Psychiatric:         Behavior: Behavior normal.         Thought Content:  Thought content normal.         Judgment: Judgment normal.             -- Michael Forrester MD

## 2023-01-31 NOTE — ASSESSMENT & PLAN NOTE
Uncontrolled. Working with nutrition. Switching to semaglutide.  Not currently interested in bariatrics referral

## 2023-01-31 NOTE — ASSESSMENT & PLAN NOTE
With morbid obesity. A1c well controlled at 6.5. Switching from dulaglutide 0.75 to semaglutide 1.0 to target uncontrolled obesity.  Schedule eye exam

## 2023-01-31 NOTE — PROGRESS NOTES
Chief Complaint   Patient presents with    Hypertension    Diabetes           Vitamin D Deficiency     Patient here today to be seen for her chronic condition follow up no concerns. 1. \"Have you been to the ER, urgent care clinic since your last visit? Hospitalized since your last visit? \" No    2. \"Have you seen or consulted any other health care providers outside of the [address] [place] [address] since your last visit? \" No     3. For patients aged 39-70: Has the patient had a colonoscopy / FIT/ Cologuard? Yes - no Care Gap present      If the patient is female:    4. For patients aged 41-77: Has the patient had a mammogram within the past 2 years? Yes - no Care Gap present      5. For patients aged 21-65: Has the patient had a pap smear?  No

## 2023-02-03 RX ORDER — ATORVASTATIN CALCIUM 40 MG/1
40 TABLET, FILM COATED ORAL DAILY
Qty: 90 TABLET | Refills: 3 | Status: SHIPPED | OUTPATIENT
Start: 2023-02-03

## 2023-06-30 DIAGNOSIS — I10 ESSENTIAL HYPERTENSION: ICD-10-CM

## 2023-06-30 DIAGNOSIS — E78.5 HYPERLIPIDEMIA, UNSPECIFIED HYPERLIPIDEMIA TYPE: ICD-10-CM

## 2023-06-30 DIAGNOSIS — E11.9 TYPE 2 DIABETES MELLITUS WITHOUT COMPLICATION, WITHOUT LONG-TERM CURRENT USE OF INSULIN (HCC): Primary | ICD-10-CM

## 2023-07-06 RX ORDER — CHLORTHALIDONE 25 MG/1
TABLET ORAL
Qty: 90 TABLET | Refills: 3 | OUTPATIENT
Start: 2023-07-06

## 2023-07-06 RX ORDER — AMLODIPINE BESYLATE 10 MG/1
TABLET ORAL
Qty: 90 TABLET | Refills: 3 | OUTPATIENT
Start: 2023-07-06

## 2023-07-06 RX ORDER — ATORVASTATIN CALCIUM 20 MG/1
TABLET, FILM COATED ORAL
Qty: 90 TABLET | Refills: 3 | OUTPATIENT
Start: 2023-07-06

## 2023-07-21 ENCOUNTER — HOSPITAL ENCOUNTER (OUTPATIENT)
Facility: HOSPITAL | Age: 53
Setting detail: SPECIMEN
End: 2023-07-21
Payer: COMMERCIAL

## 2023-07-21 ENCOUNTER — NURSE ONLY (OUTPATIENT)
Facility: CLINIC | Age: 53
End: 2023-07-21
Payer: COMMERCIAL

## 2023-07-21 DIAGNOSIS — I10 ESSENTIAL HYPERTENSION: ICD-10-CM

## 2023-07-21 DIAGNOSIS — E78.5 HYPERLIPIDEMIA, UNSPECIFIED HYPERLIPIDEMIA TYPE: ICD-10-CM

## 2023-07-21 DIAGNOSIS — E11.9 TYPE 2 DIABETES MELLITUS WITHOUT COMPLICATION, WITHOUT LONG-TERM CURRENT USE OF INSULIN (HCC): ICD-10-CM

## 2023-07-21 DIAGNOSIS — I10 ESSENTIAL HYPERTENSION: Primary | ICD-10-CM

## 2023-07-21 LAB
ANION GAP SERPL CALC-SCNC: 3 MMOL/L (ref 3–18)
BUN SERPL-MCNC: 9 MG/DL (ref 7–18)
BUN/CREAT SERPL: 11 (ref 12–20)
CALCIUM SERPL-MCNC: 9.6 MG/DL (ref 8.5–10.1)
CHLORIDE SERPL-SCNC: 103 MMOL/L (ref 100–111)
CHOLEST SERPL-MCNC: 168 MG/DL
CO2 SERPL-SCNC: 31 MMOL/L (ref 21–32)
CREAT SERPL-MCNC: 0.84 MG/DL (ref 0.6–1.3)
EST. AVERAGE GLUCOSE BLD GHB EST-MCNC: 120 MG/DL
GLUCOSE SERPL-MCNC: 89 MG/DL (ref 74–99)
HBA1C MFR BLD: 5.8 % (ref 4.2–5.6)
HDLC SERPL-MCNC: 45 MG/DL (ref 40–60)
HDLC SERPL: 3.7 (ref 0–5)
LDLC SERPL CALC-MCNC: 107.6 MG/DL (ref 0–100)
LIPID PANEL: ABNORMAL
POTASSIUM SERPL-SCNC: 4.1 MMOL/L (ref 3.5–5.5)
SODIUM SERPL-SCNC: 137 MMOL/L (ref 136–145)
TRIGL SERPL-MCNC: 77 MG/DL
VLDLC SERPL CALC-MCNC: 15.4 MG/DL

## 2023-07-21 PROCEDURE — 36415 COLL VENOUS BLD VENIPUNCTURE: CPT

## 2023-07-21 PROCEDURE — 36415 COLL VENOUS BLD VENIPUNCTURE: CPT | Performed by: FAMILY MEDICINE

## 2023-07-21 PROCEDURE — 80048 BASIC METABOLIC PNL TOTAL CA: CPT

## 2023-07-21 PROCEDURE — 80061 LIPID PANEL: CPT

## 2023-07-21 PROCEDURE — 83036 HEMOGLOBIN GLYCOSYLATED A1C: CPT

## 2023-07-28 ENCOUNTER — OFFICE VISIT (OUTPATIENT)
Facility: CLINIC | Age: 53
End: 2023-07-28
Payer: COMMERCIAL

## 2023-07-28 VITALS
HEIGHT: 65 IN | SYSTOLIC BLOOD PRESSURE: 134 MMHG | OXYGEN SATURATION: 97 % | HEART RATE: 88 BPM | DIASTOLIC BLOOD PRESSURE: 86 MMHG | BODY MASS INDEX: 44.98 KG/M2 | WEIGHT: 270 LBS | TEMPERATURE: 97.6 F

## 2023-07-28 DIAGNOSIS — E55.9 VITAMIN D DEFICIENCY: ICD-10-CM

## 2023-07-28 DIAGNOSIS — I10 ESSENTIAL HYPERTENSION: ICD-10-CM

## 2023-07-28 DIAGNOSIS — E78.5 HYPERLIPIDEMIA, UNSPECIFIED HYPERLIPIDEMIA TYPE: ICD-10-CM

## 2023-07-28 DIAGNOSIS — F51.01 PRIMARY INSOMNIA: ICD-10-CM

## 2023-07-28 DIAGNOSIS — E66.01 MORBID OBESITY (HCC): ICD-10-CM

## 2023-07-28 DIAGNOSIS — E11.9 TYPE 2 DIABETES MELLITUS WITHOUT COMPLICATION, WITHOUT LONG-TERM CURRENT USE OF INSULIN (HCC): Primary | ICD-10-CM

## 2023-07-28 PROCEDURE — 3075F SYST BP GE 130 - 139MM HG: CPT | Performed by: FAMILY MEDICINE

## 2023-07-28 PROCEDURE — 3044F HG A1C LEVEL LT 7.0%: CPT | Performed by: FAMILY MEDICINE

## 2023-07-28 PROCEDURE — 99214 OFFICE O/P EST MOD 30 MIN: CPT | Performed by: FAMILY MEDICINE

## 2023-07-28 PROCEDURE — 3079F DIAST BP 80-89 MM HG: CPT | Performed by: FAMILY MEDICINE

## 2023-07-28 RX ORDER — CHLORTHALIDONE 25 MG/1
25 TABLET ORAL DAILY
Qty: 90 TABLET | Refills: 3 | Status: SHIPPED | OUTPATIENT
Start: 2023-07-28

## 2023-07-28 RX ORDER — ENALAPRIL MALEATE 20 MG/1
40 TABLET ORAL DAILY
Qty: 180 TABLET | Refills: 3 | Status: SHIPPED | OUTPATIENT
Start: 2023-07-28

## 2023-07-28 RX ORDER — AMLODIPINE BESYLATE 10 MG/1
10 TABLET ORAL DAILY
Qty: 90 TABLET | Refills: 3 | Status: SHIPPED | OUTPATIENT
Start: 2023-07-28

## 2023-07-28 RX ORDER — ATORVASTATIN CALCIUM 20 MG/1
20 TABLET, FILM COATED ORAL DAILY
Qty: 90 TABLET | Refills: 3 | Status: SHIPPED | OUTPATIENT
Start: 2023-07-28

## 2023-07-28 SDOH — ECONOMIC STABILITY: INCOME INSECURITY: HOW HARD IS IT FOR YOU TO PAY FOR THE VERY BASICS LIKE FOOD, HOUSING, MEDICAL CARE, AND HEATING?: PATIENT DECLINED

## 2023-07-28 SDOH — ECONOMIC STABILITY: HOUSING INSECURITY
IN THE LAST 12 MONTHS, WAS THERE A TIME WHEN YOU DID NOT HAVE A STEADY PLACE TO SLEEP OR SLEPT IN A SHELTER (INCLUDING NOW)?: PATIENT REFUSED

## 2023-07-28 SDOH — ECONOMIC STABILITY: FOOD INSECURITY: WITHIN THE PAST 12 MONTHS, YOU WORRIED THAT YOUR FOOD WOULD RUN OUT BEFORE YOU GOT MONEY TO BUY MORE.: PATIENT DECLINED

## 2023-07-28 SDOH — ECONOMIC STABILITY: FOOD INSECURITY: WITHIN THE PAST 12 MONTHS, THE FOOD YOU BOUGHT JUST DIDN'T LAST AND YOU DIDN'T HAVE MONEY TO GET MORE.: PATIENT DECLINED

## 2023-07-28 ASSESSMENT — PATIENT HEALTH QUESTIONNAIRE - PHQ9
1. LITTLE INTEREST OR PLEASURE IN DOING THINGS: 0
SUM OF ALL RESPONSES TO PHQ QUESTIONS 1-9: 0
4. FEELING TIRED OR HAVING LITTLE ENERGY: 0
5. POOR APPETITE OR OVEREATING: 0
9. THOUGHTS THAT YOU WOULD BE BETTER OFF DEAD, OR OF HURTING YOURSELF: 0
8. MOVING OR SPEAKING SO SLOWLY THAT OTHER PEOPLE COULD HAVE NOTICED. OR THE OPPOSITE, BEING SO FIGETY OR RESTLESS THAT YOU HAVE BEEN MOVING AROUND A LOT MORE THAN USUAL: 0
SUM OF ALL RESPONSES TO PHQ QUESTIONS 1-9: 0
3. TROUBLE FALLING OR STAYING ASLEEP: 0
SUM OF ALL RESPONSES TO PHQ QUESTIONS 1-9: 0
6. FEELING BAD ABOUT YOURSELF - OR THAT YOU ARE A FAILURE OR HAVE LET YOURSELF OR YOUR FAMILY DOWN: 0
SUM OF ALL RESPONSES TO PHQ9 QUESTIONS 1 & 2: 0
SUM OF ALL RESPONSES TO PHQ QUESTIONS 1-9: 0
7. TROUBLE CONCENTRATING ON THINGS, SUCH AS READING THE NEWSPAPER OR WATCHING TELEVISION: 0
2. FEELING DOWN, DEPRESSED OR HOPELESS: 0

## 2023-07-28 NOTE — ASSESSMENT & PLAN NOTE
close to target. Anticipating gains with increased semaglutide.  Continue present management atorvastatin 20

## 2023-07-28 NOTE — PROGRESS NOTES
Esdras Delgado (: 1970) is a 47 y.o. female, established patient, here for:    ASSESSMENT/PLAN:  1. Type 2 diabetes mellitus without complication, without long-term current use of insulin ([address])  Assessment & Plan: With obesity. Glucose well controlled. Modest gains with in obesity. Increase to semaglutide 2 mg. Schedule eye exam     Orders:  -     Semaglutide, 2 MG/DOSE, 8 MG/3ML SOPN; Inject 2 mg into the skin once a week, Disp-9 mL, R-3Normal  -     Comprehensive Metabolic Panel; Future  -     Microalbumin / Creatinine Urine Ratio; Future  -     Lipid Panel; Future  -     Hemoglobin A1C; Future  2. Morbid obesity ([address])  Assessment & Plan:  See DM   Orders:  -     Semaglutide, 2 MG/DOSE, 8 MG/3ML SOPN; Inject 2 mg into the skin once a week, Disp-9 mL, R-3Normal  3. Essential hypertension  Assessment & Plan:  Home pressures Well controlled, continue present management amlodipine, chlorthalidone, enalapril   Orders:  -     amLODIPine (NORVASC) 10 MG tablet; Take 1 tablet by mouth daily, Disp-90 tablet, R-3Normal  -     chlorthalidone (HYGROTON) 25 MG tablet; Take 1 tablet by mouth daily, Disp-90 tablet, R-3Normal  -     enalapril (VASOTEC) 20 MG tablet; Take 2 tablets by mouth daily, Disp-180 tablet, R-3Normal  -     Comprehensive Metabolic Panel; Future  4. Hyperlipidemia, unspecified hyperlipidemia type  Assessment & Plan:   close to target. Anticipating gains with increased semaglutide. Continue present management atorvastatin 20   Orders:  -     atorvastatin (LIPITOR) 20 MG tablet; Take 1 tablet by mouth daily, Disp-90 tablet, R-3Normal  5. Vitamin D deficiency  -     Vitamin D 25 Hydroxy; Future  6. Primary insomnia  Assessment & Plan:  Handout. Attend sleep hygeine. CBT-I jose r. Dedicated visit if needed. Follow-up and Dispositions    Return in about 6 months (around 2024) for chronic medical conditions, (30).             SUBJECTIVE/OBJECTIVE:  HPI  follow up DM/morbid obesity -

## 2023-07-28 NOTE — ASSESSMENT & PLAN NOTE
With obesity. Glucose well controlled. Modest gains with in obesity. Increase to semaglutide 2 mg.  Schedule eye exam

## 2024-04-05 RX ORDER — SEMAGLUTIDE 1.34 MG/ML
INJECTION, SOLUTION SUBCUTANEOUS
Qty: 9 ML | Refills: 3 | OUTPATIENT
Start: 2024-04-05

## 2024-07-04 DIAGNOSIS — I10 ESSENTIAL HYPERTENSION: ICD-10-CM

## 2024-07-04 DIAGNOSIS — E78.5 HYPERLIPIDEMIA, UNSPECIFIED HYPERLIPIDEMIA TYPE: ICD-10-CM

## 2024-07-05 ENCOUNTER — HOSPITAL ENCOUNTER (OUTPATIENT)
Facility: HOSPITAL | Age: 54
Setting detail: SPECIMEN
End: 2024-07-05
Payer: COMMERCIAL

## 2024-07-05 ENCOUNTER — OFFICE VISIT (OUTPATIENT)
Facility: CLINIC | Age: 54
End: 2024-07-05
Payer: COMMERCIAL

## 2024-07-05 VITALS
TEMPERATURE: 98.7 F | OXYGEN SATURATION: 97 % | SYSTOLIC BLOOD PRESSURE: 118 MMHG | HEIGHT: 65 IN | WEIGHT: 263.8 LBS | DIASTOLIC BLOOD PRESSURE: 80 MMHG | HEART RATE: 84 BPM | BODY MASS INDEX: 43.95 KG/M2

## 2024-07-05 DIAGNOSIS — Z12.31 ENCOUNTER FOR SCREENING MAMMOGRAM FOR MALIGNANT NEOPLASM OF BREAST: ICD-10-CM

## 2024-07-05 DIAGNOSIS — E55.9 VITAMIN D DEFICIENCY: ICD-10-CM

## 2024-07-05 DIAGNOSIS — T75.3XXA MOTION SICKNESS, INITIAL ENCOUNTER: ICD-10-CM

## 2024-07-05 DIAGNOSIS — Z11.4 ENCOUNTER FOR SCREENING FOR HIV: ICD-10-CM

## 2024-07-05 DIAGNOSIS — I10 ESSENTIAL HYPERTENSION: ICD-10-CM

## 2024-07-05 DIAGNOSIS — E78.5 HYPERLIPIDEMIA, UNSPECIFIED HYPERLIPIDEMIA TYPE: ICD-10-CM

## 2024-07-05 DIAGNOSIS — E66.01 MORBID OBESITY (HCC): ICD-10-CM

## 2024-07-05 DIAGNOSIS — F51.01 PRIMARY INSOMNIA: ICD-10-CM

## 2024-07-05 DIAGNOSIS — E11.9 TYPE 2 DIABETES MELLITUS WITHOUT COMPLICATION, WITHOUT LONG-TERM CURRENT USE OF INSULIN (HCC): Primary | ICD-10-CM

## 2024-07-05 LAB
25(OH)D3 SERPL-MCNC: 35 NG/ML (ref 30–100)
ALBUMIN SERPL-MCNC: 3.8 G/DL (ref 3.4–5)
ALBUMIN/GLOB SERPL: 1 (ref 0.8–1.7)
ALP SERPL-CCNC: 77 U/L (ref 45–117)
ALT SERPL-CCNC: 22 U/L (ref 13–56)
ANION GAP SERPL CALC-SCNC: 7 MMOL/L (ref 3–18)
AST SERPL-CCNC: 10 U/L (ref 10–38)
BILIRUB SERPL-MCNC: 0.4 MG/DL (ref 0.2–1)
BUN SERPL-MCNC: 9 MG/DL (ref 7–18)
BUN/CREAT SERPL: 9 (ref 12–20)
CALCIUM SERPL-MCNC: 9.8 MG/DL (ref 8.5–10.1)
CHLORIDE SERPL-SCNC: 102 MMOL/L (ref 100–111)
CHOLEST SERPL-MCNC: 152 MG/DL
CO2 SERPL-SCNC: 31 MMOL/L (ref 21–32)
CREAT SERPL-MCNC: 0.96 MG/DL (ref 0.6–1.3)
CREAT UR-MCNC: 164 MG/DL (ref 30–125)
EST. AVERAGE GLUCOSE BLD GHB EST-MCNC: 114 MG/DL
GLOBULIN SER CALC-MCNC: 3.8 G/DL (ref 2–4)
GLUCOSE SERPL-MCNC: 96 MG/DL (ref 74–99)
HBA1C MFR BLD: 5.6 % (ref 4.2–5.6)
HDLC SERPL-MCNC: 46 MG/DL (ref 40–60)
HDLC SERPL: 3.3 (ref 0–5)
LDLC SERPL CALC-MCNC: 89.6 MG/DL (ref 0–100)
LIPID PANEL: NORMAL
MICROALBUMIN UR-MCNC: 0.8 MG/DL (ref 0–3)
MICROALBUMIN/CREAT UR-RTO: 5 MG/G (ref 0–30)
POTASSIUM SERPL-SCNC: 3.7 MMOL/L (ref 3.5–5.5)
PROT SERPL-MCNC: 7.6 G/DL (ref 6.4–8.2)
SODIUM SERPL-SCNC: 140 MMOL/L (ref 136–145)
TRIGL SERPL-MCNC: 82 MG/DL
VLDLC SERPL CALC-MCNC: 16.4 MG/DL

## 2024-07-05 PROCEDURE — 82043 UR ALBUMIN QUANTITATIVE: CPT

## 2024-07-05 PROCEDURE — 3074F SYST BP LT 130 MM HG: CPT | Performed by: FAMILY MEDICINE

## 2024-07-05 PROCEDURE — 36415 COLL VENOUS BLD VENIPUNCTURE: CPT

## 2024-07-05 PROCEDURE — G2211 COMPLEX E/M VISIT ADD ON: HCPCS | Performed by: FAMILY MEDICINE

## 2024-07-05 PROCEDURE — 83036 HEMOGLOBIN GLYCOSYLATED A1C: CPT

## 2024-07-05 PROCEDURE — 82306 VITAMIN D 25 HYDROXY: CPT

## 2024-07-05 PROCEDURE — 3079F DIAST BP 80-89 MM HG: CPT | Performed by: FAMILY MEDICINE

## 2024-07-05 PROCEDURE — 80053 COMPREHEN METABOLIC PANEL: CPT

## 2024-07-05 PROCEDURE — 80061 LIPID PANEL: CPT

## 2024-07-05 PROCEDURE — 99214 OFFICE O/P EST MOD 30 MIN: CPT | Performed by: FAMILY MEDICINE

## 2024-07-05 PROCEDURE — 87389 HIV-1 AG W/HIV-1&-2 AB AG IA: CPT

## 2024-07-05 PROCEDURE — 82570 ASSAY OF URINE CREATININE: CPT

## 2024-07-05 RX ORDER — AMLODIPINE BESYLATE 10 MG/1
10 TABLET ORAL DAILY
Qty: 90 TABLET | Refills: 3 | OUTPATIENT
Start: 2024-07-05

## 2024-07-05 RX ORDER — ENALAPRIL MALEATE 20 MG/1
40 TABLET ORAL DAILY
Qty: 180 TABLET | Refills: 3 | Status: SHIPPED | OUTPATIENT
Start: 2024-07-05

## 2024-07-05 RX ORDER — AMLODIPINE BESYLATE 10 MG/1
10 TABLET ORAL DAILY
Qty: 90 TABLET | Refills: 3 | Status: SHIPPED | OUTPATIENT
Start: 2024-07-05

## 2024-07-05 RX ORDER — ATORVASTATIN CALCIUM 20 MG/1
20 TABLET, FILM COATED ORAL DAILY
Qty: 90 TABLET | Refills: 3 | Status: SHIPPED | OUTPATIENT
Start: 2024-07-05

## 2024-07-05 RX ORDER — CHLORTHALIDONE 25 MG/1
25 TABLET ORAL DAILY
Qty: 90 TABLET | Refills: 3 | OUTPATIENT
Start: 2024-07-05

## 2024-07-05 RX ORDER — CHLORTHALIDONE 25 MG/1
25 TABLET ORAL DAILY
Qty: 90 TABLET | Refills: 3 | Status: SHIPPED | OUTPATIENT
Start: 2024-07-05

## 2024-07-05 RX ORDER — MECLIZINE HYDROCHLORIDE 25 MG/1
25 TABLET ORAL 3 TIMES DAILY PRN
Qty: 30 TABLET | Refills: 0 | Status: SHIPPED | OUTPATIENT
Start: 2024-07-05 | End: 2024-07-15

## 2024-07-05 RX ORDER — ATORVASTATIN CALCIUM 20 MG/1
20 TABLET, FILM COATED ORAL DAILY
Qty: 90 TABLET | Refills: 3 | OUTPATIENT
Start: 2024-07-05

## 2024-07-05 ASSESSMENT — PATIENT HEALTH QUESTIONNAIRE - PHQ9
SUM OF ALL RESPONSES TO PHQ QUESTIONS 1-9: 0
SUM OF ALL RESPONSES TO PHQ QUESTIONS 1-9: 0
1. LITTLE INTEREST OR PLEASURE IN DOING THINGS: NOT AT ALL
SUM OF ALL RESPONSES TO PHQ9 QUESTIONS 1 & 2: 0
SUM OF ALL RESPONSES TO PHQ QUESTIONS 1-9: 0
SUM OF ALL RESPONSES TO PHQ QUESTIONS 1-9: 0
2. FEELING DOWN, DEPRESSED OR HOPELESS: NOT AT ALL

## 2024-07-05 ASSESSMENT — ENCOUNTER SYMPTOMS
SHORTNESS OF BREATH: 0
CHEST TIGHTNESS: 0

## 2024-07-05 NOTE — PROGRESS NOTES
Chief Complaint   Patient presents with    Diabetes    Hypertension    Hyperlipidemia    Insomnia    Vitamin D def     Patient here for her chronic cond f/u. She has not had her labs done.            \"Have you been to the ER, urgent care clinic since your last visit?  Hospitalized since your last visit?\"    NO    “Have you seen or consulted any other health care providers outside of [facility] since your last visit?”    NO    Have you had a mammogram?”   NO    Date of last Mammogram: 8/6/2021      “Have you had a pap smear?”    NO    Date of last Cervical Cancer screen (HPV or PAP): 3/17/2017             Click Here for Release of Records Request    
conversation to generate a clinical note. The patient (or guardian, if applicable) and other individuals in attendance at the appointment consented to the use of AI, including the recording.      -- Mary Romero MD

## 2024-07-09 DIAGNOSIS — E11.9 TYPE 2 DIABETES MELLITUS WITHOUT COMPLICATION, WITHOUT LONG-TERM CURRENT USE OF INSULIN (HCC): Primary | ICD-10-CM

## 2024-07-10 LAB
HIV 1+2 AB+HIV1 P24 AG SERPL QL IA: NONREACTIVE
HIV 1/2 RESULT COMMENT: NORMAL

## 2024-08-16 ENCOUNTER — COMMUNITY OUTREACH (OUTPATIENT)
Facility: CLINIC | Age: 54
End: 2024-08-16

## 2024-08-16 NOTE — PROGRESS NOTES
Patient's HM shows they are overdue for Hemoglobin A1C and Mammogram.  Care Everywhere and  files searched.  No results to attach to order nor HM updated.     HM updated with most recent A1C, not due.  Future order placed.

## 2024-08-20 ENCOUNTER — OFFICE VISIT (OUTPATIENT)
Age: 54
End: 2024-08-20
Payer: COMMERCIAL

## 2024-08-20 VITALS
BODY MASS INDEX: 42.99 KG/M2 | HEIGHT: 65 IN | WEIGHT: 258 LBS | HEART RATE: 84 BPM | OXYGEN SATURATION: 99 % | TEMPERATURE: 98 F

## 2024-08-20 DIAGNOSIS — M54.16 LUMBAR NEURITIS: Primary | ICD-10-CM

## 2024-08-20 DIAGNOSIS — M96.1 LUMBAR POST-LAMINECTOMY SYNDROME: ICD-10-CM

## 2024-08-20 DIAGNOSIS — M51.36 DDD (DEGENERATIVE DISC DISEASE), LUMBAR: ICD-10-CM

## 2024-08-20 PROCEDURE — 99214 OFFICE O/P EST MOD 30 MIN: CPT | Performed by: PHYSICAL MEDICINE & REHABILITATION

## 2024-08-20 PROCEDURE — 72110 X-RAY EXAM L-2 SPINE 4/>VWS: CPT | Performed by: PHYSICAL MEDICINE & REHABILITATION

## 2024-08-20 RX ORDER — METHYLPREDNISOLONE 4 MG/1
TABLET ORAL
Qty: 1 KIT | Refills: 0 | Status: SHIPPED | OUTPATIENT
Start: 2024-08-20

## 2024-08-20 NOTE — PROGRESS NOTES
overt nerve impingement, although there is transient contact of crossing L5 nerves within respective lateral recesses.There is also transient distortion  of crossing right S1 nerve within the right L5-S1 lateral recess. The patient is LHD. At her last clinical appointment, Patient elected to proceed with blocks. I will order right sided L4 SNRB and right sided L5 SNRBs. Patient is neurologically intact. I will see the patient back prn.       She was last seen by me on 7/1/2022 and she was told to come back prn. The patient returns today with pain location and distribution remains unchanged. She rates her pain  2-9 /10, previously 7/10. Patient says that overall her pain is the same when compared to the last office visit. Her spinal surgery was in 2013 by . She reports that her right sided L4 and L5 SNRB provided 90% benefit. She reports her flare of pain began in the last 6 months and is progressive. GFR of 71 on 7/5/2024. She has a history of DM and reports blood sugars are well controlled, consistently remaining below 200.A1c of 5.6 on 7/5/2024. She denies changes in bowel or bladder habits. She has been taking extra strength Tylenol with benefit. She is non compliant with her daily HEP, she does her HEP 2x/week.      reviewed. Body mass index is 42.93 kg/m².    PCP: Mary Romero MD      Past Medical History:   Diagnosis Date    Asthma     Cervical dysplasia 1997    colposcopy surveillance, normal since    COVID-19 01/17/2022    sore throat    Depression     Diabetes mellitus (HCC) 2016    Controlled    Hypertension     IUD (intrauterine device) in place 8/2/2012    Mirena    Morbid obesity (HCC)        Social History     Socioeconomic History    Marital status:      Spouse name: None    Number of children: None    Years of education: None    Highest education level: None   Tobacco Use    Smoking status: Never    Smokeless tobacco: Never   Substance and Sexual Activity    Alcohol use: Yes

## 2024-08-21 DIAGNOSIS — M54.16 LUMBAR NEURITIS: ICD-10-CM

## 2024-08-21 DIAGNOSIS — M51.36 DDD (DEGENERATIVE DISC DISEASE), LUMBAR: ICD-10-CM

## 2024-08-21 DIAGNOSIS — M96.1 LUMBAR POST-LAMINECTOMY SYNDROME: ICD-10-CM

## 2024-08-27 SDOH — ECONOMIC STABILITY: INCOME INSECURITY: HOW HARD IS IT FOR YOU TO PAY FOR THE VERY BASICS LIKE FOOD, HOUSING, MEDICAL CARE, AND HEATING?: NOT HARD AT ALL

## 2024-08-27 SDOH — ECONOMIC STABILITY: FOOD INSECURITY: WITHIN THE PAST 12 MONTHS, YOU WORRIED THAT YOUR FOOD WOULD RUN OUT BEFORE YOU GOT MONEY TO BUY MORE.: NEVER TRUE

## 2024-08-27 SDOH — ECONOMIC STABILITY: TRANSPORTATION INSECURITY
IN THE PAST 12 MONTHS, HAS LACK OF TRANSPORTATION KEPT YOU FROM MEETINGS, WORK, OR FROM GETTING THINGS NEEDED FOR DAILY LIVING?: NO

## 2024-08-27 SDOH — ECONOMIC STABILITY: FOOD INSECURITY: WITHIN THE PAST 12 MONTHS, THE FOOD YOU BOUGHT JUST DIDN'T LAST AND YOU DIDN'T HAVE MONEY TO GET MORE.: NEVER TRUE

## 2024-08-30 ENCOUNTER — OFFICE VISIT (OUTPATIENT)
Facility: CLINIC | Age: 54
End: 2024-08-30

## 2024-08-30 VITALS
SYSTOLIC BLOOD PRESSURE: 138 MMHG | TEMPERATURE: 98.4 F | WEIGHT: 260 LBS | DIASTOLIC BLOOD PRESSURE: 86 MMHG | BODY MASS INDEX: 43.32 KG/M2 | HEIGHT: 65 IN | HEART RATE: 86 BPM | OXYGEN SATURATION: 99 %

## 2024-08-30 DIAGNOSIS — T75.3XXD MOTION SICKNESS, SUBSEQUENT ENCOUNTER: Primary | ICD-10-CM

## 2024-08-30 DIAGNOSIS — E11.9 TYPE 2 DIABETES MELLITUS WITHOUT COMPLICATION, WITHOUT LONG-TERM CURRENT USE OF INSULIN (HCC): ICD-10-CM

## 2024-08-30 RX ORDER — SCOLOPAMINE TRANSDERMAL SYSTEM 1 MG/1
1 PATCH, EXTENDED RELEASE TRANSDERMAL
Qty: 10 PATCH | Refills: 1 | Status: SHIPPED | OUTPATIENT
Start: 2024-08-30

## 2024-08-30 NOTE — PROGRESS NOTES
Chief Complaint   Patient presents with    Motion Sickness     Patient states she is going on a cruise and would like to discuss treatment for motion sickness.      Patient has not had her DM eye exam done and is planning to schedule.     \"Have you been to the ER, urgent care clinic since your last visit?  Hospitalized since your last visit?\"    NO    “Have you seen or consulted any other health care providers outside of [facility] since your last visit?”    NO    Have you had a mammogram?”   NO Scheduled end of Sept.     Date of last Mammogram: 8/6/2021      “Have you had a pap smear?”    NO    Date of last Cervical Cancer screen (HPV or PAP): 3/17/2017             Click Here for Release of Records Request

## 2024-08-30 NOTE — ASSESSMENT & PLAN NOTE
With Morbid obesity. Currently managed with Ozempic, recently increased to 2 mg. She reported feeling better overall and noted a decrease in her focus on food, which has helped with weight management. However, she has been experiencing queasy stomach symptoms since starting the increased dose of Ozempic (2 mg) a month ago. She was advised to continue the current regimen and to report any significant changes in symptoms.  If the gastrointestinal side effects persist, alternative diabetes management strategies will be considered.

## 2024-08-30 NOTE — PROGRESS NOTES
Connie Christiansen (: 1970) is a 54 y.o. female, established patient, here for:    ASSESSMENT/PLAN:  Assessment & Plan  Motion sickness, subsequent encounter   Symptoms are consistent with motion sickness. Scopolamine patches were recommended for use during her upcoming cruise, with Antivert as a backup option. A prescription for 10 scopolamine patches was provided, with instructions to apply one patch at least 4 hours prior to departure and to replace it every 3 days. A refill was also provided for future use. She was advised to bring Antivert along as a precautionary measure. Potential side effects of scopolamine, including blurred vision and dryness, were discussed.    Orders:    scopolamine (TRANSDERM-SCOP) transdermal patch; Place 1 patch onto the skin every 72 hours    Type 2 diabetes mellitus without complication, without long-term current use of insulin (HCC)   With Morbid obesity. Currently managed with Ozempic, recently increased to 2 mg. She reported feeling better overall and noted a decrease in her focus on food, which has helped with weight management. However, she has been experiencing queasy stomach symptoms since starting the increased dose of Ozempic (2 mg) a month ago. She was advised to continue the current regimen and to report any significant changes in symptoms.  If the gastrointestinal side effects persist, alternative diabetes management strategies will be considered.                     SUBJECTIVE/OBJECTIVE:  Chief Complaint   Patient presents with    Motion Sickness     Patient states she is going on a cruise and would like to discuss treatment for motion sickness.       History of Present Illness  The patient presents for evaluation of multiple medical concerns.    She is preparing for a 7-day cruise and had been prescribed Antivert for motion sickness. However, she has concerns about its effectiveness, as a similar over-the-counter drug did not provide relief in the past. She  5\")   Wt 117.9 kg (260 lb)   SpO2 99%   BMI 43.27 kg/m²    Physical Exam  Constitutional:       General: She is not in acute distress.     Appearance: Normal appearance.   HENT:      Head: Normocephalic and atraumatic.   Pulmonary:      Effort: Pulmonary effort is normal.   Neurological:      Mental Status: She is alert and oriented to person, place, and time.           The patient (or guardian, if applicable) and other individuals in attendance with the patient were advised that Artificial Intelligence will be utilized during this visit to record and process the conversation to generate a clinical note. The patient (or guardian, if applicable) and other individuals in attendance at the appointment consented to the use of AI, including the recording.      -- Mary Romero MD

## 2024-09-05 ENCOUNTER — PATIENT MESSAGE (OUTPATIENT)
Age: 54
End: 2024-09-05

## 2024-09-05 DIAGNOSIS — M51.36 DDD (DEGENERATIVE DISC DISEASE), LUMBAR: ICD-10-CM

## 2024-09-05 DIAGNOSIS — M54.16 LUMBAR NEURITIS: Primary | ICD-10-CM

## 2024-09-05 DIAGNOSIS — M96.1 LUMBAR POST-LAMINECTOMY SYNDROME: ICD-10-CM

## 2024-09-06 RX ORDER — GABAPENTIN 300 MG/1
300 CAPSULE ORAL 3 TIMES DAILY
Qty: 90 CAPSULE | Refills: 1 | Status: SHIPPED | OUTPATIENT
Start: 2024-09-06 | End: 2024-11-05

## 2024-10-03 ENCOUNTER — TELEPHONE (OUTPATIENT)
Facility: CLINIC | Age: 54
End: 2024-10-03

## 2024-10-03 NOTE — TELEPHONE ENCOUNTER
Spoke with Frances with [facility] who stated they did a right breast u/s on patient today and patient will need a right breast u/s guided biopsy done, she states the comprehensive order that was signed for patients dx mammogram and u/s can also be used for the biopsy order if that will be okay. She has been made aware that will be okay and I will send a message to Dr. Romero so she is aware.   
urged to avoid alchohol

## 2024-10-14 ENCOUNTER — TELEPHONE (OUTPATIENT)
Age: 54
End: 2024-10-14

## 2024-10-14 NOTE — PROGRESS NOTES
daily. (Patient not taking: Reported on 8/20/2024)       No current facility-administered medications for this visit.       Allergies   Allergen Reactions    Metformin Nausea And Vomiting          PHYSICAL EXAMINATION    Pulse 71   Temp 98 °F (36.7 °C) (Skin)   Ht 1.651 m (5' 5\")   Wt 121.1 kg (267 lb)   SpO2 97%   BMI 44.43 kg/m²       CONSTITUTIONAL: NAD, A&O x 3    Ambulates without an assistive device.    MOTOR:  Straight Leg Raise: Negative, Bilaterally     Hip Flex Knee Ext Knee Flex Ankle DF GTE Ankle PF Tone   Right +4/5 +4/5 +4/5 +4/5 +4/5 +4/5 +4/5   Left +4/5 +4/5 +4/5 +4/5 +4/5 +4/5 +4/5     SENSATION: Sensation is intact to light touch throughout.         ASSESSMENT   Connie was seen today for back pain.    Diagnoses and all orders for this visit:    Lumbar neuritis  -     Ambulatory Referral to Ortho Injection    Lumbar post-laminectomy syndrome  -     Ambulatory Referral to Ortho Injection    Annular tear of lumbar disc  -     Ambulatory Referral to Ortho Injection    Degeneration of intervertebral disc of lumbar region with discogenic back pain  -     Ambulatory Referral to Ortho Injection          IMPRESSION AND PLAN:  Patient returns to the office today with c/o progressive low back pain into the lateral hips (R>L). Multiple treatment options were discussed. She is not interested in surgery at this time. However, she elected to proceed with blocks. I will order a L3-L4 GÓMEZ, pending approval from her PCP secondary to DM. I recommended she increase the frequency of her HEP to daily. I discussed referring the pt to a chronic pain management center, pt declined. The patient is Neurologically intact. I will see the patient back after the Epidural or earlier if needed.     Written by cira Sanchez scribe, as dictated by Fidel Kirkland MD  I examined the patient, reviewed and agree with the note.

## 2024-10-14 NOTE — TELEPHONE ENCOUNTER
The pt was identified using 2 pt identifiers. I called the pt to make sure she ad her MRI done ordered at her last visit. Pt had it done at [facility] and she stated she does have a disk with the imaging on it as well.

## 2024-10-15 ENCOUNTER — OFFICE VISIT (OUTPATIENT)
Age: 54
End: 2024-10-15
Payer: COMMERCIAL

## 2024-10-15 VITALS
OXYGEN SATURATION: 97 % | WEIGHT: 267 LBS | HEIGHT: 65 IN | HEART RATE: 71 BPM | BODY MASS INDEX: 44.48 KG/M2 | TEMPERATURE: 98 F

## 2024-10-15 DIAGNOSIS — M51.369 ANNULAR TEAR OF LUMBAR DISC: ICD-10-CM

## 2024-10-15 DIAGNOSIS — M54.16 LUMBAR NEURITIS: Primary | ICD-10-CM

## 2024-10-15 DIAGNOSIS — M51.360 DEGENERATION OF INTERVERTEBRAL DISC OF LUMBAR REGION WITH DISCOGENIC BACK PAIN: ICD-10-CM

## 2024-10-15 DIAGNOSIS — M96.1 LUMBAR POST-LAMINECTOMY SYNDROME: ICD-10-CM

## 2024-10-15 PROCEDURE — 99214 OFFICE O/P EST MOD 30 MIN: CPT | Performed by: PHYSICAL MEDICINE & REHABILITATION

## 2024-10-23 DIAGNOSIS — F41.9 ANXIETY: Primary | ICD-10-CM

## 2024-10-23 RX ORDER — DIAZEPAM 10 MG/1
10 TABLET ORAL ONCE
Qty: 1 TABLET | Refills: 0 | Status: SHIPPED | OUTPATIENT
Start: 2024-10-23 | End: 2024-10-23

## 2024-10-23 NOTE — TELEPHONE ENCOUNTER
Patient did express she would like Valium sent to her [facility] in [place] for her upcoming block procedure scheduled 10/29/24 at [facility].  Thank you.

## 2024-10-24 ENCOUNTER — TELEPHONE (OUTPATIENT)
Facility: CLINIC | Age: 54
End: 2024-10-24

## 2024-10-24 NOTE — TELEPHONE ENCOUNTER
Ninoska called to follow up on form that was received on yesterday(Original fax was not received). Informed her that Irwin was already gone for the day and that  would most likely be out of the office until next Tuesday with a illness. Ninoska then asked if there would be another provider in the office that may be able to take of this as the patient is being seen on next Tuesday which would cause a delay in patient care (appt would have to be pushed out to around 11/12). After speaking with Deepti I was instructed to inform Ninoska that a message would be sent to Irwin for her to review upon her arrival tomorrow to see if maybe the form may be scanned into the system and sent to  for her to review and complete. Ninoska expressed understanding and is looking forward to receiving a call back with an update as soon as possible. Please review and advise.

## 2024-11-06 ENCOUNTER — TELEPHONE (OUTPATIENT)
Age: 54
End: 2024-11-06

## 2024-11-06 NOTE — TELEPHONE ENCOUNTER
Spoke with patient on 10/31/24 in follow-up from her procedure that was scheduled on 10/29/24 at [facility] that had to be cancelled and rescheduled for a new date since consent could not be corrected after patient was given Valium at facility and error on consent went undetected.    Patient stated due to work availability for her to be able to request off, she needed her new Sx date to be next year.  Patient scheduled for 1/14/25 at [facility] w/Dr. Kirkland for her LESI L3/L4.      Patient will need to have a 30 day block note done again for this new sx date and another prescription for Valium closer to surgery date.

## 2024-12-19 NOTE — PROGRESS NOTES
dose. Max Daily Amount: 10 mg 1 tablet 0    semaglutide, 2 MG/DOSE, (OZEMPIC) 8 MG/3ML SOPN sc injection Inject 2 mg into the skin once a week 9 mL 3    amLODIPine (NORVASC) 10 MG tablet Take 1 tablet by mouth daily 90 tablet 3    atorvastatin (LIPITOR) 20 MG tablet Take 1 tablet by mouth daily 90 tablet 3    chlorthalidone (HYGROTON) 25 MG tablet Take 1 tablet by mouth daily 90 tablet 3    enalapril (VASOTEC) 20 MG tablet Take 2 tablets by mouth daily 180 tablet 3    Cholecalciferol 50 MCG (2000 UT) CAPS Take 1 capsule by mouth daily      ibuprofen (ADVIL;MOTRIN) 400 MG tablet Take 1.5 tablets by mouth every 6 hours as needed      levonorgestrel (MIRENA, 52 MG,) IUD 52 mg ceived the following from [facility]: Outside name: MIRENA 20 mcg/24 hr (5 years) IUD      gabapentin (NEURONTIN) 300 MG capsule Take 1 capsule by mouth 3 times daily for 60 days. Max Daily Amount: 900 mg 90 capsule 1    scopolamine (TRANSDERM-SCOP) transdermal patch Place 1 patch onto the skin every 72 hours (Patient not taking: Reported on 12/20/2024) 10 patch 1    methylPREDNISolone (MEDROL DOSEPACK) 4 MG tablet Follow package directions (Patient not taking: Reported on 8/30/2024) 1 kit 0    gabapentin (NEURONTIN) 300 MG capsule Take 1 capsule by mouth 3 times daily. (Patient not taking: Reported on 8/20/2024)      melatonin 10 MG CAPS capsule Take by mouth (Patient not taking: Reported on 12/20/2024)       No current facility-administered medications for this visit.       Allergies   Allergen Reactions    Metformin Nausea And Vomiting          PHYSICAL EXAMINATION    Temp 97 °F (36.1 °C) (Temporal)   Ht 1.651 m (5' 5\")   Wt 123.4 kg (272 lb)   BMI 45.26 kg/m²       CONSTITUTIONAL: NAD, A&O x 3    Ambulates without an assistive device.    MOTOR:  Straight Leg Raise: Negative, Bilaterally     Hip Flex Knee Ext Knee Flex Ankle DF GTE Ankle PF Tone   Right +4/5 +4/5 +4/5 +4/5 +4/5 +4/5 +4/5   Left +4/5 +4/5 +4/5 +4/5 +4/5 +4/5

## 2024-12-20 ENCOUNTER — OFFICE VISIT (OUTPATIENT)
Age: 54
End: 2024-12-20
Payer: COMMERCIAL

## 2024-12-20 VITALS — HEIGHT: 65 IN | WEIGHT: 272 LBS | TEMPERATURE: 97 F | BODY MASS INDEX: 45.32 KG/M2

## 2024-12-20 DIAGNOSIS — M51.369 ANNULAR TEAR OF LUMBAR DISC: ICD-10-CM

## 2024-12-20 DIAGNOSIS — M51.360 DEGENERATION OF INTERVERTEBRAL DISC OF LUMBAR REGION WITH DISCOGENIC BACK PAIN: ICD-10-CM

## 2024-12-20 DIAGNOSIS — M96.1 LUMBAR POST-LAMINECTOMY SYNDROME: ICD-10-CM

## 2024-12-20 DIAGNOSIS — M54.16 LUMBAR NEURITIS: Primary | ICD-10-CM

## 2024-12-20 PROCEDURE — 99214 OFFICE O/P EST MOD 30 MIN: CPT | Performed by: PHYSICAL MEDICINE & REHABILITATION

## 2024-12-20 RX ORDER — DIAZEPAM 10 MG/1
10 TABLET ORAL ONCE
Qty: 1 TABLET | Refills: 0 | Status: SHIPPED | OUTPATIENT
Start: 2025-01-08 | End: 2025-01-08

## 2024-12-20 RX ORDER — DULOXETIN HYDROCHLORIDE 20 MG/1
20 CAPSULE, DELAYED RELEASE ORAL
Qty: 30 CAPSULE | Refills: 2 | Status: SHIPPED | OUTPATIENT
Start: 2024-12-20

## 2024-12-23 ENCOUNTER — HOSPITAL ENCOUNTER (OUTPATIENT)
Facility: HOSPITAL | Age: 54
Setting detail: SPECIMEN
Discharge: HOME | End: 2024-12-26
Payer: COMMERCIAL

## 2024-12-23 ENCOUNTER — NURSE ONLY (OUTPATIENT)
Facility: CLINIC | Age: 54
End: 2024-12-23
Payer: COMMERCIAL

## 2024-12-23 DIAGNOSIS — I10 ESSENTIAL HYPERTENSION: Primary | ICD-10-CM

## 2024-12-23 DIAGNOSIS — E11.9 TYPE 2 DIABETES MELLITUS WITHOUT COMPLICATION, WITHOUT LONG-TERM CURRENT USE OF INSULIN (HCC): ICD-10-CM

## 2024-12-23 LAB
ALBUMIN SERPL-MCNC: 4.1 G/DL (ref 3.4–5)
ALBUMIN/GLOB SERPL: 1.2 (ref 0.8–1.7)
ALP SERPL-CCNC: 84 U/L (ref 45–117)
ALT SERPL-CCNC: 30 U/L (ref 13–56)
ANION GAP SERPL CALC-SCNC: 5 MMOL/L (ref 3–18)
AST SERPL-CCNC: 15 U/L (ref 10–38)
BILIRUB SERPL-MCNC: 0.8 MG/DL (ref 0.2–1)
BUN SERPL-MCNC: 13 MG/DL (ref 7–18)
BUN/CREAT SERPL: 14 (ref 12–20)
CALCIUM SERPL-MCNC: 9.7 MG/DL (ref 8.5–10.1)
CHLORIDE SERPL-SCNC: 104 MMOL/L (ref 100–111)
CO2 SERPL-SCNC: 31 MMOL/L (ref 21–32)
CREAT SERPL-MCNC: 0.93 MG/DL (ref 0.6–1.3)
EST. AVERAGE GLUCOSE BLD GHB EST-MCNC: 151 MG/DL
GLOBULIN SER CALC-MCNC: 3.5 G/DL (ref 2–4)
GLUCOSE SERPL-MCNC: 117 MG/DL (ref 74–99)
HBA1C MFR BLD: 6.9 % (ref 4.2–5.6)
POTASSIUM SERPL-SCNC: 4.1 MMOL/L (ref 3.5–5.5)
PROT SERPL-MCNC: 7.6 G/DL (ref 6.4–8.2)
SODIUM SERPL-SCNC: 140 MMOL/L (ref 136–145)

## 2024-12-23 PROCEDURE — 80053 COMPREHEN METABOLIC PANEL: CPT

## 2024-12-23 PROCEDURE — 83036 HEMOGLOBIN GLYCOSYLATED A1C: CPT

## 2024-12-23 PROCEDURE — 36415 COLL VENOUS BLD VENIPUNCTURE: CPT | Performed by: FAMILY MEDICINE

## 2024-12-23 PROCEDURE — 36415 COLL VENOUS BLD VENIPUNCTURE: CPT

## 2024-12-23 NOTE — PROGRESS NOTES
Patient here for her NV lab draw name and  verified venipuncture performed on patients right arm was successful patient tolerated well.

## 2025-01-06 ENCOUNTER — TELEPHONE (OUTPATIENT)
Facility: CLINIC | Age: 55
End: 2025-01-06

## 2025-01-06 NOTE — TELEPHONE ENCOUNTER
Ruby called in regards to an update on a Diabetic Assessment Form that was sent on 12/17 & 12/27. Informed Amara that Irwin was in patient care at the moment but a message could be sent back requesting a call with an update. Amara would like a callback as soon as possible as pt has an appt 1/14. Please review and advise as soon as possible.

## 2025-01-08 ENCOUNTER — TELEPHONE (OUTPATIENT)
Age: 55
End: 2025-01-08

## 2025-01-08 DIAGNOSIS — F41.9 ANXIETY: Primary | ICD-10-CM

## 2025-01-08 RX ORDER — DIAZEPAM 10 MG/1
TABLET ORAL
Qty: 1 TABLET | Refills: 0 | Status: SHIPPED | OUTPATIENT
Start: 2025-01-08 | End: 2025-01-23

## 2025-01-08 NOTE — TELEPHONE ENCOUNTER
Coumadin clinic to dose your next dose of Coumadin  Follow up with PCP for post hospital visit stay check up   Please send 10 mg valium to [facility] in [place], [place] for injection on 01.14.2025

## 2025-01-10 ENCOUNTER — OFFICE VISIT (OUTPATIENT)
Facility: CLINIC | Age: 55
End: 2025-01-10

## 2025-01-10 VITALS
OXYGEN SATURATION: 96 % | DIASTOLIC BLOOD PRESSURE: 84 MMHG | BODY MASS INDEX: 45.48 KG/M2 | TEMPERATURE: 98.6 F | HEART RATE: 94 BPM | HEIGHT: 65 IN | SYSTOLIC BLOOD PRESSURE: 132 MMHG | WEIGHT: 273 LBS

## 2025-01-10 DIAGNOSIS — E11.9 TYPE 2 DIABETES MELLITUS WITHOUT COMPLICATION, WITHOUT LONG-TERM CURRENT USE OF INSULIN (HCC): Primary | ICD-10-CM

## 2025-01-10 DIAGNOSIS — E66.01 MORBID OBESITY: ICD-10-CM

## 2025-01-10 DIAGNOSIS — I10 ESSENTIAL HYPERTENSION: ICD-10-CM

## 2025-01-10 DIAGNOSIS — E78.5 HYPERLIPIDEMIA, UNSPECIFIED HYPERLIPIDEMIA TYPE: ICD-10-CM

## 2025-01-10 RX ORDER — ENALAPRIL MALEATE 20 MG/1
40 TABLET ORAL DAILY
Qty: 180 TABLET | Refills: 3 | Status: SHIPPED | OUTPATIENT
Start: 2025-01-10

## 2025-01-10 RX ORDER — ATORVASTATIN CALCIUM 20 MG/1
20 TABLET, FILM COATED ORAL DAILY
Qty: 90 TABLET | Refills: 3 | Status: SHIPPED | OUTPATIENT
Start: 2025-01-10

## 2025-01-10 RX ORDER — CHLORTHALIDONE 25 MG/1
25 TABLET ORAL DAILY
Qty: 90 TABLET | Refills: 3 | Status: SHIPPED | OUTPATIENT
Start: 2025-01-10

## 2025-01-10 RX ORDER — AMLODIPINE BESYLATE 10 MG/1
10 TABLET ORAL DAILY
Qty: 90 TABLET | Refills: 3 | Status: SHIPPED | OUTPATIENT
Start: 2025-01-10

## 2025-01-10 SDOH — ECONOMIC STABILITY: FOOD INSECURITY: WITHIN THE PAST 12 MONTHS, THE FOOD YOU BOUGHT JUST DIDN'T LAST AND YOU DIDN'T HAVE MONEY TO GET MORE.: NEVER TRUE

## 2025-01-10 SDOH — ECONOMIC STABILITY: FOOD INSECURITY: WITHIN THE PAST 12 MONTHS, YOU WORRIED THAT YOUR FOOD WOULD RUN OUT BEFORE YOU GOT MONEY TO BUY MORE.: NEVER TRUE

## 2025-01-10 ASSESSMENT — PATIENT HEALTH QUESTIONNAIRE - PHQ9
SUM OF ALL RESPONSES TO PHQ QUESTIONS 1-9: 0
2. FEELING DOWN, DEPRESSED OR HOPELESS: NOT AT ALL
SUM OF ALL RESPONSES TO PHQ9 QUESTIONS 1 & 2: 0
1. LITTLE INTEREST OR PLEASURE IN DOING THINGS: NOT AT ALL
SUM OF ALL RESPONSES TO PHQ QUESTIONS 1-9: 0

## 2025-01-10 NOTE — PROGRESS NOTES
Chief Complaint   Patient presents with    Diabetes     Patient here for her 6 month follow up. She is requesting refills on her Ozempic which she has been out of since October.            \"Have you been to the ER, urgent care clinic since your last visit?  Hospitalized since your last visit?\"    NO    “Have you seen or consulted any other health care providers outside our system since your last visit?”    NO     “Have you had a pap smear?”    NO    Date of last Cervical Cancer screen (HPV or PAP): 3/17/2017       “Have you had a diabetic eye exam?”    NO     Date of last diabetic eye exam: 4/26/2016

## 2025-01-10 NOTE — ASSESSMENT & PLAN NOTE
Borderline today. Expect improvement with resumption of semaglutide for DM/obesity. Continue present management  amlodipine, chlorthalidone, enalapril

## 2025-01-10 NOTE — PROGRESS NOTES
Connie Christiansen (: 1970) is a 54 y.o. female, established patient, here for:    ASSESSMENT/PLAN:  Type 2 diabetes mellitus without complication, without long-term current use of insulin ([facility])  Assessment & Plan:  With morbid obesity. A1c level is currently at 6.9, within the target range of less than 7. Patient has been off semaglutide since mid-2024 due to prescription refill issues.  - Issue a new prescription for semaglutide, starting with the lowest strength (0.25 mg) for one month, then increasing to 0.5 mg. Afterward, switch to a different product delivering 1 mg for one month, followed by a 2 mg dose.  - Send lower strength doses to local [facility] pharmacy and higher strength doses to [facility].  - Schedule an appointment with an ophthalmologist for a diabetic eye screening.  - Schedule lab work for diabetes in approximately 6 months.   Orders:  -     semaglutide, 2 MG/DOSE, (OZEMPIC) 8 MG/3ML SOPN sc injection; Inject 2 mg into the skin once a week START AFTER FINISHING 1 MG DOSE, Disp-9 mL, R-3Normal  -     Semaglutide,0.25 or 0.5MG/DOS, 2 MG/3ML SOPN; Inject 0.25 mg into the skin once a week for 28 days, THEN 0.5 mg once a week for 28 days., Disp-6 mL, R-1Normal  -     Semaglutide, 1 MG/DOSE, (OZEMPIC) 4 MG/3ML SOPN sc injection; Inject 1 mg into the skin every 7 days START AFTER FINISHING 0.25 AND 0.5 MG DOSES, Disp-4 Adjustable Dose Pre-filled Pen Syringe, R-0Normal  -     Amb External Referral To Ophthalmology  -     Comprehensive Metabolic Panel; Future  -     Albumin/Creatinine Ratio, Urine; Future  -     Lipid Panel; Future  -     Hemoglobin A1C; Future  Morbid obesity  -     semaglutide, 2 MG/DOSE, (OZEMPIC) 8 MG/3ML SOPN sc injection; Inject 2 mg into the skin once a week START AFTER FINISHING 1 MG DOSE, Disp-9 mL, R-3Normal  -     Semaglutide,0.25 or 0.5MG/DOS, 2 MG/3ML SOPN; Inject 0.25 mg into the skin once a week for 28 days, THEN 0.5 mg once a week for 28 days., Disp-6 mL,

## 2025-01-10 NOTE — ASSESSMENT & PLAN NOTE
With morbid obesity. A1c level is currently at 6.9, within the target range of less than 7. Patient has been off semaglutide since mid-October 2024 due to prescription refill issues.  - Issue a new prescription for semaglutide, starting with the lowest strength (0.25 mg) for one month, then increasing to 0.5 mg. Afterward, switch to a different product delivering 1 mg for one month, followed by a 2 mg dose.  - Send lower strength doses to local [facility] pharmacy and higher strength doses to [facility].  - Schedule an appointment with an ophthalmologist for a diabetic eye screening.  - Schedule lab work for diabetes in approximately 6 months.

## 2025-01-23 ENCOUNTER — SCHEDULED TELEPHONE ENCOUNTER (OUTPATIENT)
Age: 55
End: 2025-01-23
Payer: COMMERCIAL

## 2025-01-23 DIAGNOSIS — M43.16 SPONDYLOLISTHESIS OF LUMBAR REGION: ICD-10-CM

## 2025-01-23 DIAGNOSIS — M96.1 LUMBAR POST-LAMINECTOMY SYNDROME: ICD-10-CM

## 2025-01-23 DIAGNOSIS — M54.16 LUMBAR NEURITIS: Primary | ICD-10-CM

## 2025-01-23 DIAGNOSIS — M51.369 ANNULAR TEAR OF LUMBAR DISC: ICD-10-CM

## 2025-01-23 PROCEDURE — 99213 OFFICE O/P EST LOW 20 MIN: CPT | Performed by: NURSE PRACTITIONER

## 2025-01-23 NOTE — PROGRESS NOTES
Sandorbrent Christiansen presents today for   Chief Complaint   Patient presents with    Pain    Back Problem    Back Pain       Is someone accompanying this pt? no    Is the patient using any DME equipment during OV? no    Depression Screening:       No data to display                Learning Assessment:  Failed to redirect to the Timeline version of the Versa SmartLink.    Abuse Screening:       No data to display                Fall Risk  Failed to redirect to the Timeline version of the Versa SmartLink.    OPIOID RISK TOOL  Failed to redirect to the Timeline version of the Versa SmartLink.    Coordination of Care:  1. Have you been to the ER, urgent care clinic since your last visit? no  Hospitalized since your last visit? no    2. Have you seen or consulted any other health care providers outside of the [facility] since your last visit? no Include any pap smears or colon screening. no

## 2025-01-23 NOTE — PROGRESS NOTES
Connie Christiansen is a 54 y.o. female evaluated via telephone on 1/23/2025 for Pain, Back Problem, and Back Pain  .      History of Present Illness:Connie Christiansen is a  54 y.o.  female who has back pain that radiates to her bilateral lateral hips and sometimes into her legs.  Her pain is increased with sitting and standing.  She describes it as going from dull and aching to sharp.  It is progressively gotten worse.  She is status post a right L4-5 laminectomy in the past.  Dr. Kirkland started her on Cymbalta 20 mg daily but due to pharmacy issues she was only able to start it this week so it is too soon to gauge the effectiveness of it.  She is being set up to undergo L3-4 epidural steroid injection.  Her MRI demonstrates postsurgical findings at L4-5 and a retrolisthesis with mild broad-based disc bulge and endplate spondylosis at L4-L5.       Physical Exam: Patient is a 54-year-old female who is alert and oriented.  She spoke with fluency.  She did not appear to be in distress.      Assessment and Plan: This is a patient who has back pain that radiates to her bilateral hips.  She is undergoing a L3-4 epidural steroid injection on February 6, 2025.  We will see her back after the injection.      Connie was seen today for pain, back problem and back pain.    Diagnoses and all orders for this visit:    Lumbar neuritis    Lumbar post-laminectomy syndrome    Annular tear of lumbar disc    Spondylolisthesis of lumbar region          Documentation:  I communicated with the patient and/or health care decision maker about back and radicular pain.   Details of this discussion including any medical advice provided: Yes    Total Time: 8:24 AM to 8:39 AM 15 minutes    Connie Christiansen was evaluated through a synchronous (real-time) audio encounter. Patient identification was verified at the start of the visit. She (or guardian if applicable) is aware that this is a billable service, which includes applicable co-pays. This

## 2025-02-18 ENCOUNTER — OFFICE VISIT (OUTPATIENT)
Facility: CLINIC | Age: 55
End: 2025-02-18

## 2025-02-18 ENCOUNTER — HOSPITAL ENCOUNTER (OUTPATIENT)
Facility: HOSPITAL | Age: 55
Setting detail: SPECIMEN
Discharge: HOME | End: 2025-02-21
Payer: COMMERCIAL

## 2025-02-18 VITALS
TEMPERATURE: 99 F | BODY MASS INDEX: 44.98 KG/M2 | OXYGEN SATURATION: 97 % | HEART RATE: 88 BPM | SYSTOLIC BLOOD PRESSURE: 138 MMHG | DIASTOLIC BLOOD PRESSURE: 84 MMHG | HEIGHT: 65 IN | WEIGHT: 270 LBS

## 2025-02-18 DIAGNOSIS — Z11.3 SCREENING EXAMINATION FOR STI: ICD-10-CM

## 2025-02-18 DIAGNOSIS — Z01.419 WELL WOMAN EXAM: Primary | ICD-10-CM

## 2025-02-18 DIAGNOSIS — Z23 ENCOUNTER FOR IMMUNIZATION: ICD-10-CM

## 2025-02-18 DIAGNOSIS — Z12.4 SCREENING FOR CERVICAL CANCER: ICD-10-CM

## 2025-02-18 PROCEDURE — 88175 CYTOPATH C/V AUTO FLUID REDO: CPT

## 2025-02-18 PROCEDURE — 87491 CHLMYD TRACH DNA AMP PROBE: CPT

## 2025-02-18 PROCEDURE — 87591 N.GONORRHOEAE DNA AMP PROB: CPT

## 2025-02-18 PROCEDURE — 87624 HPV HI-RISK TYP POOLED RSLT: CPT

## 2025-02-18 NOTE — PROGRESS NOTES
Chief Complaint   Patient presents with    Gynecologic Exam     Patient here for her annual exam. She would like to be screened for STI's           \"Have you been to the ER, urgent care clinic since your last visit?  Hospitalized since your last visit?\"    NO    “Have you seen or consulted any other health care providers outside our system since your last visit?”    NO     “Have you had a pap smear?”    NO     Date of last Cervical Cancer screen (HPV or PAP): 3/17/2017       “Have you had a diabetic eye exam?”    NO     Date of last diabetic eye exam: 4/26/2016            
vaccine (1 of 2) Never done    Flu vaccine (1) 08/01/2024    COVID-19 Vaccine (3 - 2024-25 season) 09/01/2024    Diabetic foot exam  07/05/2025    Diabetic Alb to Cr ratio (uACR) test  07/05/2025    Lipids  07/05/2025    DTaP/Tdap/Td vaccine (2 - Td or Tdap) 09/03/2025    Breast cancer screen  10/10/2025    A1C test (Diabetic or Prediabetic)  12/23/2025    GFR test (Diabetes, CKD 3-4, OR last GFR 15-59)  12/23/2025    Depression Screen  01/10/2026    Colorectal Cancer Screen  03/08/2032    Hepatitis B vaccine  Completed    Pneumococcal 50+ years Vaccine  Completed    Hepatitis C screen  Completed    HIV screen  Completed    Hepatitis A vaccine  Aged Out    Hib vaccine  Aged Out    Polio vaccine  Aged Out    Meningococcal (ACWY) vaccine  Aged Out    Pneumococcal 0-49 years Vaccine  Discontinued         Pap hx: normal    Mammogram Result (most recent):  TRAMAINE ABEL DIGITAL DIAGNOSTIC UNILATERAL RIGHT 10/03/2024    Narrative  EXAM: MAMMO-DIGITAL DIAGNOSTIC RT W/ TOMOSYNTHESIS    INDICATION: N64.89: Breast asymmetry  --Additional relevant history provided by patient: Screening recall for right breast focal asymmetry.    COMPARISON: 2024.    TECHNIQUE: Images were obtained with 3-D breast tomosynthesis and C-view reconstruction of 2-D images. The 2-D images were viewed with computer aided detection as an adjunct.   Targeted real-time ultrasound examination of the breast was performed by myself and the technologist.  _______________    FINDINGS:    Right breast, posterior third, upper outer quadrant irregular shaped equal density circumscribed mass persists with spot compression.    Right breast ultrasound: Ultrasound was performed at the 11 o'clock position.  Irregular shaped, echogenic thick-walled anechoic mass at the 11:00 position measures 1.3 x 0.9 x 2.4 cm, 12 cm from the nipple. Finding correlates with abnormality seen on mammogram. No evidence for internal vascularity. There is posterior acoustic enhancement.  No

## 2025-02-19 LAB
C TRACH RRNA SPEC QL NAA+PROBE: NEGATIVE
N GONORRHOEA RRNA SPEC QL NAA+PROBE: NEGATIVE
SPECIMEN SOURCE: NORMAL

## 2025-02-24 LAB — HPV I/H RISK 1 DNA CVX QL PROBE+SIG AMP: NEGATIVE

## 2025-03-14 ENCOUNTER — TELEPHONE (OUTPATIENT)
Age: 55
End: 2025-03-14

## 2025-04-01 DIAGNOSIS — E11.9 TYPE 2 DIABETES MELLITUS WITHOUT COMPLICATION, WITHOUT LONG-TERM CURRENT USE OF INSULIN: ICD-10-CM

## 2025-04-01 DIAGNOSIS — E66.01 MORBID OBESITY: ICD-10-CM

## 2025-04-01 RX ORDER — SEMAGLUTIDE 1.34 MG/ML
INJECTION, SOLUTION SUBCUTANEOUS
Refills: 1 | OUTPATIENT
Start: 2025-04-01

## 2025-06-30 ENCOUNTER — CLINICAL SUPPORT (OUTPATIENT)
Facility: CLINIC | Age: 55
End: 2025-06-30
Payer: COMMERCIAL

## 2025-06-30 ENCOUNTER — HOSPITAL ENCOUNTER (OUTPATIENT)
Facility: HOSPITAL | Age: 55
Setting detail: SPECIMEN
Discharge: HOME | End: 2025-07-03
Payer: COMMERCIAL

## 2025-06-30 DIAGNOSIS — E11.9 TYPE 2 DIABETES MELLITUS WITHOUT COMPLICATION, WITHOUT LONG-TERM CURRENT USE OF INSULIN (HCC): ICD-10-CM

## 2025-06-30 DIAGNOSIS — E11.9 TYPE 2 DIABETES MELLITUS WITHOUT COMPLICATION, WITHOUT LONG-TERM CURRENT USE OF INSULIN (HCC): Primary | ICD-10-CM

## 2025-06-30 LAB
ALBUMIN SERPL-MCNC: 3.7 G/DL (ref 3.4–5)
ALBUMIN/GLOB SERPL: 1.1 (ref 0.8–1.7)
ALP SERPL-CCNC: 74 U/L (ref 45–117)
ALT SERPL-CCNC: 15 U/L (ref 10–35)
ANION GAP SERPL CALC-SCNC: 12 MMOL/L (ref 3–18)
AST SERPL-CCNC: 13 U/L (ref 10–38)
BILIRUB SERPL-MCNC: 0.3 MG/DL (ref 0.2–1)
BUN SERPL-MCNC: 11 MG/DL (ref 6–23)
BUN/CREAT SERPL: 15 (ref 12–20)
CALCIUM SERPL-MCNC: 9.7 MG/DL (ref 8.5–10.1)
CHLORIDE SERPL-SCNC: 102 MMOL/L (ref 98–107)
CHOLEST SERPL-MCNC: 156 MG/DL
CO2 SERPL-SCNC: 27 MMOL/L (ref 21–32)
CREAT SERPL-MCNC: 0.7 MG/DL (ref 0.6–1.3)
CREAT UR-MCNC: 193 MG/DL (ref 30–125)
EST. AVERAGE GLUCOSE BLD GHB EST-MCNC: 114 MG/DL
GLOBULIN SER CALC-MCNC: 3.3 G/DL (ref 2–4)
GLUCOSE SERPL-MCNC: 93 MG/DL (ref 74–108)
HBA1C MFR BLD: 5.6 % (ref 4.2–5.6)
HDLC SERPL-MCNC: 40 MG/DL (ref 40–60)
HDLC SERPL: 3.9 (ref 0–5)
LDLC SERPL CALC-MCNC: 98 MG/DL (ref 0–100)
MICROALBUMIN UR-MCNC: <1.2 MG/DL (ref 0–3)
MICROALBUMIN/CREAT UR-RTO: ABNORMAL MG/G (ref 0–30)
POTASSIUM SERPL-SCNC: 4 MMOL/L (ref 3.5–5.5)
PROT SERPL-MCNC: 6.9 G/DL (ref 6.4–8.2)
SODIUM SERPL-SCNC: 140 MMOL/L (ref 136–145)
TRIGL SERPL-MCNC: 90 MG/DL (ref 0–150)
VLDLC SERPL CALC-MCNC: 18 MG/DL

## 2025-06-30 PROCEDURE — 80061 LIPID PANEL: CPT

## 2025-06-30 PROCEDURE — 82043 UR ALBUMIN QUANTITATIVE: CPT

## 2025-06-30 PROCEDURE — 83036 HEMOGLOBIN GLYCOSYLATED A1C: CPT

## 2025-06-30 PROCEDURE — 36415 COLL VENOUS BLD VENIPUNCTURE: CPT | Performed by: FAMILY MEDICINE

## 2025-06-30 PROCEDURE — 82570 ASSAY OF URINE CREATININE: CPT

## 2025-06-30 PROCEDURE — 36415 COLL VENOUS BLD VENIPUNCTURE: CPT

## 2025-06-30 PROCEDURE — 80053 COMPREHEN METABOLIC PANEL: CPT

## 2025-06-30 NOTE — PROGRESS NOTES
Patient here for lab draw name and  verified venipuncture performed on patient's right arm was successful patient tolerated well.    Pt arrives ambulatory with c/o cough, congestions, chills and body aches since Tuesday night. Denies fever.

## (undated) DEVICE — SYR 20ML LL STRL LF --

## (undated) DEVICE — SOLUTION IRRIG 1000ML H2O STRL BLT

## (undated) DEVICE — ENDOSCOPY PUMP TUBING/ CAP SET: Brand: ERBE

## (undated) DEVICE — SYR 50ML SLIP TIP NSAF LF STRL --

## (undated) DEVICE — CATHETER SUCT TR FL TIP 14FR W/ O CTRL

## (undated) DEVICE — MEDI-VAC SUCTION HIGH CAPACITY: Brand: CARDINAL HEALTH

## (undated) DEVICE — CANNULA NSL AD TBNG L14FT STD PVC O2 CRV CONN NONFLARED NSL

## (undated) DEVICE — FLEX ADVANTAGE 3000CC: Brand: FLEX ADVANTAGE

## (undated) DEVICE — GAUZE,SPONGE,4"X4",16PLY,STRL,LF,10/TRAY: Brand: MEDLINE

## (undated) DEVICE — FLUFF AND POLYMER UNDERPAD,EXTRA HEAVY: Brand: WINGS

## (undated) DEVICE — GOWN ISOL IMPERV UNIV, DISP, OPEN BACK, BLUE --

## (undated) DEVICE — SYRINGE MED 25GA 3ML L5/8IN SUBQ PLAS W/ DETACH NDL SFTY

## (undated) DEVICE — MEDI-VAC NON-CONDUCTIVE SUCTION TUBING: Brand: CARDINAL HEALTH

## (undated) DEVICE — SYR 10ML LUER LOK 1/5ML GRAD --

## (undated) DEVICE — CANNULA ORIG TL CLR W FOAM CUSHIONS AND 14FT SUPL TB 3 CHN